# Patient Record
Sex: FEMALE | Race: WHITE | Employment: PART TIME | URBAN - METROPOLITAN AREA
[De-identification: names, ages, dates, MRNs, and addresses within clinical notes are randomized per-mention and may not be internally consistent; named-entity substitution may affect disease eponyms.]

---

## 2017-02-01 ENCOUNTER — HOSPITAL ENCOUNTER (OUTPATIENT)
Dept: MAMMOGRAPHY | Age: 52
Discharge: HOME | End: 2017-02-01
Attending: OBSTETRICS & GYNECOLOGY
Payer: COMMERCIAL

## 2017-02-01 DIAGNOSIS — Z12.31 VISIT FOR SCREENING MAMMOGRAM: ICD-10-CM

## 2017-02-01 PROCEDURE — 77067 SCR MAMMO BI INCL CAD: CPT

## 2018-05-30 ENCOUNTER — HOSPITAL ENCOUNTER (OUTPATIENT)
Dept: MAMMOGRAPHY | Age: 53
Discharge: HOME | End: 2018-05-30
Attending: FAMILY MEDICINE
Payer: COMMERCIAL

## 2018-05-30 DIAGNOSIS — Z12.39 BREAST CANCER SCREENING: ICD-10-CM

## 2018-05-30 PROCEDURE — 77067 SCR MAMMO BI INCL CAD: CPT

## 2018-06-01 NOTE — PROGRESS NOTES
Dear Mrs. Wing     Your recent mammogram showed :No mammographic evidence of malignancy.      Recommend annual mammogram in one year.  A reminder letter will be scheduled.     Thanks,  Dr. Joe Rosen

## 2018-11-16 ENCOUNTER — HOSPITAL ENCOUNTER (OUTPATIENT)
Dept: SURGERY | Age: 53
Discharge: HOME | End: 2018-11-16
Payer: COMMERCIAL

## 2018-11-16 VITALS
WEIGHT: 136 LBS | SYSTOLIC BLOOD PRESSURE: 135 MMHG | DIASTOLIC BLOOD PRESSURE: 81 MMHG | HEIGHT: 65 IN | OXYGEN SATURATION: 99 % | HEART RATE: 65 BPM | BODY MASS INDEX: 22.66 KG/M2 | TEMPERATURE: 97.6 F

## 2018-11-16 LAB — HGB BLD-MCNC: 15.1 G/DL (ref 11.7–15.4)

## 2018-11-16 PROCEDURE — 85018 HEMOGLOBIN: CPT

## 2018-11-16 NOTE — PERIOP NOTES
Patient verified name and  Order for consent not found in EHR; patient verified that case posting is incorrect. Per patient and Dr. Emma Ruano office note dated 11/15/18 ovaries are to be removed as well. Surgeon's office is closed today. Will have to call on Monday to notify surgery scheduler. Type 2 surgery, walk in assessment complete. Labs per surgeon: unknown; no orders received. Labs per anesthesia protocol: HGB; results pending. T&S dos. EKG: not needed per [facility] protocols. Most recent stress/echo report dated 10/31/13 on chart for reference. Requesting most recent office note (?) from [facility] cardiology. Hibiclens and instructions given per hospital policy. Patient provided with and instructed on educational handouts including Guide to Surgery, Pain Management, Hand Hygiene, Blood Transfusion Education, and [place] [facility] Brochure. Patient answered medical/surgical history questions at their best of ability. All prior to admission medications documented in [facility]. Original medication prescription bottles visualized during patient appointment. Patient instructed to hold all vitamins 7 days prior to surgery and NSAIDS 5 days prior to surgery, patient verbalized understanding. Medications to be held: vitamins. Patient instructed to continue previous medications as prescribed prior to surgery and to take the following medications the day of surgery according to anesthesia guidelines with a small sip of water: levothyroxine, dexilant. Instructed to bring dexilant and [employer]. Patient teach back successful and patient demonstrates knowledge of instructions.

## 2018-11-19 NOTE — PERIOP NOTES
Called surgeon's office and spoke with Claudia, surgery scheduler for Dr Darshan Wright. Erinn Ingrams informed that pt stated she was having her ovaries removed as well as hysterectomy with bilateral salpingectomy and surgeon's office note also states hysterectomy with BSO planned- Claudia states she will call OR scheduling to add procedure. Received EKG from [facility] (10/7/13)- normal tracing/result. Will place on chart for anesthesia reference.

## 2018-12-04 ENCOUNTER — ANESTHESIA EVENT (OUTPATIENT)
Dept: SURGERY | Age: 53
DRG: 743 | End: 2018-12-04
Payer: COMMERCIAL

## 2018-12-04 NOTE — H&P
Subjective:  
 
Chad Marina, MRN: [id number], is a 46 y.o.  female presents with AUB due to known large fibroids; these have been growing for years and patient has decided to have surgery. Beatriz Fuller gradually worsening course. See office notes on care. Patient Active Problem List  
 Diagnosis Date Noted  Migraine  Peripheral vertigo of both ears  Deviated septum  Unspecified hearing loss, unspecified ear  Intractable migraine with aura with status migrainosus 2016  Nystagmus 2016  Migraine with vertigo 2016  Encounter for medication management 2016  Postural imbalance 2016  Uterus fibroma   
 H. pylori infection 10/09/2013  Rosacea 10/24/2012  Hypothyroid 10/24/2012  IBS (irritable bowel syndrome) 10/24/2012 Past Medical History:  
Diagnosis Date  Deviated septum  GERD (gastroesophageal reflux disease)   
 prn med  
 H. pylori infection 10/9/2013  Heart murmur   
 echo 10/31/13  Hypothyroid 10/24/2012  IBS (irritable bowel syndrome) 10/24/2012  
 denies  Migraine   
 with head lights  Peripheral vertigo of both ears  Rosacea 10/24/2012  Spontaneous pneumothorax   
 had chest tube  Unspecified hearing loss, unspecified ear  Uterus fibroma  VSD (ventricular septal defect)   
 h/o; had heart cath in  but was not found so no repair was needed. Past Surgical History:  
Procedure Laterality Date  HX HEART CATHETERIZATION    
 for VSD repair but was not found during cath  HX THYROIDECTOMY  2011  
  
[unfilled] No Known Allergies Social History Tobacco Use  Smoking status: Former Smoker Years: 5.00 Last attempt to quit: 1999 Years since quittin.0  Smokeless tobacco: Never Used  Tobacco comment: social smoker for 5 years Substance Use Topics  Alcohol use: Yes Alcohol/week: 8.4 oz Types: 14 Glasses of wine per week Comment: white wine Family History Problem Relation Age of Onset  Breast Cancer Paternal Aunt  Thyroid Disease Mother  No Known Problems Father  Thyroid Disease Sister  No Known Problems Maternal Grandmother  No Known Problems Maternal Grandfather  No Known Problems Paternal Grandmother  No Known Problems Paternal Grandfather Prenatal Labs: No results found for: RUBELLAEXT, GRBSEXT, HBSAGEXT, HIVEXT, RPREXT, GONNOEXT, CHLAMEXT Review of Systems Constitutional: negative Respiratory: negative Cardiovascular: negative Musculoskeletal:negative Objective:  
 
No data found. No intake or output data in the 24 hours ending 12/04/18 1352 There were no vitals taken for this visit. General appearance: alert, cooperative, no distress, appears stated age Head: Normocephalic, without obvious abnormality, atraumatic Back: symmetric, no curvature. ROM normal. No CVA tenderness. Lungs: clear to auscultation bilaterally Heart: regular rate and rhythm, S1, S2 normal, no murmur, click, rub or gallop Abdomen: soft, non-tender. Bowel sounds normal.  no organomegaly, abnormal findings:  mass, located in the lower abdomen c/w uterine fibroids arising from pelvis Pelvic: External genitalia normal, Vagina normal without discharge, Urethra without abnormality or discharge, no bladder tenderness, cervix normal in appearance Extremities: extremities normal, atraumatic, no cyanosis or edema Pulses: 2+ and symmetric Skin: Skin color, texture, turgor normal. No rashes or lesions Assessment:  
 
Active Problems: * No active hospital problems. * 
 
 
Enlarging uterine fibroids, pt for KANDY Discussed risks of infection, DVT, damage to bowel/bladder/other internal organs, bleeding/transfusion, scar tissue/adhesions. All questions answered, will proceed. Plan:  
 
KANDY/BSO under general  anesthesia

## 2018-12-05 ENCOUNTER — HOSPITAL ENCOUNTER (INPATIENT)
Age: 53
LOS: 2 days | Discharge: HOME | DRG: 743 | End: 2018-12-07
Attending: OBSTETRICS & GYNECOLOGY | Admitting: OBSTETRICS & GYNECOLOGY
Payer: COMMERCIAL

## 2018-12-05 ENCOUNTER — ANESTHESIA (OUTPATIENT)
Dept: SURGERY | Age: 53
DRG: 743 | End: 2018-12-05
Payer: COMMERCIAL

## 2018-12-05 DIAGNOSIS — Z90.710 S/P ABDOMINAL HYSTERECTOMY: Primary | ICD-10-CM

## 2018-12-05 PROBLEM — N93.9 ABNORMAL UTERINE BLEEDING (AUB): Status: ACTIVE | Noted: 2018-12-05

## 2018-12-05 LAB
ABO + RH BLD: NORMAL
BLOOD GROUP ANTIBODIES SERPL: NORMAL
HCG UR QL: NEGATIVE
SPECIMEN EXP DATE BLD: NORMAL

## 2018-12-05 PROCEDURE — 77030039266 HC ADH SKN EXOFIN S2SG -A: Performed by: OBSTETRICS & GYNECOLOGY

## 2018-12-05 PROCEDURE — 0UT90ZZ RESECTION OF UTERUS, OPEN APPROACH: ICD-10-PCS | Performed by: OBSTETRICS & GYNECOLOGY

## 2018-12-05 PROCEDURE — 88307 TISSUE EXAM BY PATHOLOGIST: CPT

## 2018-12-05 PROCEDURE — 77030011278 HC ELECTRD LIG IMPT COVD -F: Performed by: OBSTETRICS & GYNECOLOGY

## 2018-12-05 PROCEDURE — 76210000016 HC OR PH I REC 1 TO 1.5 HR: Performed by: OBSTETRICS & GYNECOLOGY

## 2018-12-05 PROCEDURE — 76060000034 HC ANESTHESIA 1.5 TO 2 HR: Performed by: OBSTETRICS & GYNECOLOGY

## 2018-12-05 PROCEDURE — 77030020782 HC GWN BAIR PAWS FLX 3M -B: Performed by: ANESTHESIOLOGY

## 2018-12-05 PROCEDURE — 77030018836 HC SOL IRR NACL ICUM -A: Performed by: OBSTETRICS & GYNECOLOGY

## 2018-12-05 PROCEDURE — 74011250637 HC RX REV CODE- 250/637: Performed by: ANESTHESIOLOGY

## 2018-12-05 PROCEDURE — 77030037088 HC TUBE ENDOTRACH ORAL NSL COVD-A: Performed by: ANESTHESIOLOGY

## 2018-12-05 PROCEDURE — 77030032490 HC SLV COMPR SCD KNE COVD -B: Performed by: OBSTETRICS & GYNECOLOGY

## 2018-12-05 PROCEDURE — 65270000029 HC RM PRIVATE

## 2018-12-05 PROCEDURE — 77030031139 HC SUT VCRL2 J&J -A: Performed by: OBSTETRICS & GYNECOLOGY

## 2018-12-05 PROCEDURE — 0UT20ZZ RESECTION OF BILATERAL OVARIES, OPEN APPROACH: ICD-10-PCS | Performed by: OBSTETRICS & GYNECOLOGY

## 2018-12-05 PROCEDURE — 74011250637 HC RX REV CODE- 250/637: Performed by: OBSTETRICS & GYNECOLOGY

## 2018-12-05 PROCEDURE — 77030034849: Performed by: OBSTETRICS & GYNECOLOGY

## 2018-12-05 PROCEDURE — 86901 BLOOD TYPING SEROLOGIC RH(D): CPT

## 2018-12-05 PROCEDURE — 77030008703 HC TU ET UNCUF COVD -A: Performed by: ANESTHESIOLOGY

## 2018-12-05 PROCEDURE — 77030002888 HC SUT CHRMC J&J -A: Performed by: OBSTETRICS & GYNECOLOGY

## 2018-12-05 PROCEDURE — 74011250636 HC RX REV CODE- 250/636

## 2018-12-05 PROCEDURE — 74011250636 HC RX REV CODE- 250/636: Performed by: ANESTHESIOLOGY

## 2018-12-05 PROCEDURE — 74011000250 HC RX REV CODE- 250

## 2018-12-05 PROCEDURE — 76010000162 HC OR TIME 1.5 TO 2 HR INTENSV-TIER 1: Performed by: OBSTETRICS & GYNECOLOGY

## 2018-12-05 PROCEDURE — 77030003029 HC SUT VCRL J&J -B: Performed by: OBSTETRICS & GYNECOLOGY

## 2018-12-05 PROCEDURE — 81025 URINE PREGNANCY TEST: CPT

## 2018-12-05 PROCEDURE — 74011250636 HC RX REV CODE- 250/636: Performed by: OBSTETRICS & GYNECOLOGY

## 2018-12-05 PROCEDURE — 77030039425 HC BLD LARYNG TRULITE DISP TELE -A: Performed by: ANESTHESIOLOGY

## 2018-12-05 PROCEDURE — 77030011266 HC ELECTRD BLD INSL COVD -A: Performed by: OBSTETRICS & GYNECOLOGY

## 2018-12-05 PROCEDURE — 77030002966 HC SUT PDS J&J -A: Performed by: OBSTETRICS & GYNECOLOGY

## 2018-12-05 PROCEDURE — 77030027138 HC INCENT SPIROMETER -A

## 2018-12-05 PROCEDURE — 0UT70ZZ RESECTION OF BILATERAL FALLOPIAN TUBES, OPEN APPROACH: ICD-10-PCS | Performed by: OBSTETRICS & GYNECOLOGY

## 2018-12-05 PROCEDURE — 77030002974 HC SUT PLN J&J -A: Performed by: OBSTETRICS & GYNECOLOGY

## 2018-12-05 RX ORDER — CELECOXIB 100 MG/1
200 CAPSULE ORAL
Status: ACTIVE | OUTPATIENT
Start: 2018-12-05 | End: 2018-12-06

## 2018-12-05 RX ORDER — SODIUM CHLORIDE 0.9 % (FLUSH) 0.9 %
5-10 SYRINGE (ML) INJECTION AS NEEDED
Status: DISCONTINUED | OUTPATIENT
Start: 2018-12-05 | End: 2018-12-07 | Stop reason: HOSPADM

## 2018-12-05 RX ORDER — LIDOCAINE HYDROCHLORIDE 20 MG/ML
INJECTION, SOLUTION EPIDURAL; INFILTRATION; INTRACAUDAL; PERINEURAL AS NEEDED
Status: DISCONTINUED | OUTPATIENT
Start: 2018-12-05 | End: 2018-12-05 | Stop reason: HOSPADM

## 2018-12-05 RX ORDER — SODIUM CHLORIDE 0.9 % (FLUSH) 0.9 %
5-10 SYRINGE (ML) INJECTION EVERY 8 HOURS
Status: DISCONTINUED | OUTPATIENT
Start: 2018-12-05 | End: 2018-12-05 | Stop reason: HOSPADM

## 2018-12-05 RX ORDER — DOCUSATE SODIUM 100 MG/1
100 CAPSULE, LIQUID FILLED ORAL 2 TIMES DAILY
Status: DISCONTINUED | OUTPATIENT
Start: 2018-12-05 | End: 2018-12-07 | Stop reason: HOSPADM

## 2018-12-05 RX ORDER — NEOSTIGMINE METHYLSULFATE 1 MG/ML
INJECTION INTRAVENOUS AS NEEDED
Status: DISCONTINUED | OUTPATIENT
Start: 2018-12-05 | End: 2018-12-05 | Stop reason: HOSPADM

## 2018-12-05 RX ORDER — PROMETHAZINE HYDROCHLORIDE 25 MG/1
25 TABLET ORAL
Status: DISCONTINUED | OUTPATIENT
Start: 2018-12-05 | End: 2018-12-07 | Stop reason: HOSPADM

## 2018-12-05 RX ORDER — HYDROMORPHONE HYDROCHLORIDE 1 MG/ML
INJECTION, SOLUTION INTRAMUSCULAR; INTRAVENOUS; SUBCUTANEOUS AS NEEDED
Status: DISCONTINUED | OUTPATIENT
Start: 2018-12-05 | End: 2018-12-05 | Stop reason: HOSPADM

## 2018-12-05 RX ORDER — KETOROLAC TROMETHAMINE 30 MG/ML
INJECTION, SOLUTION INTRAMUSCULAR; INTRAVENOUS AS NEEDED
Status: DISCONTINUED | OUTPATIENT
Start: 2018-12-05 | End: 2018-12-05 | Stop reason: HOSPADM

## 2018-12-05 RX ORDER — PROPOFOL 10 MG/ML
INJECTION, EMULSION INTRAVENOUS AS NEEDED
Status: DISCONTINUED | OUTPATIENT
Start: 2018-12-05 | End: 2018-12-05 | Stop reason: HOSPADM

## 2018-12-05 RX ORDER — SODIUM CHLORIDE, SODIUM LACTATE, POTASSIUM CHLORIDE, CALCIUM CHLORIDE 600; 310; 30; 20 MG/100ML; MG/100ML; MG/100ML; MG/100ML
75 INJECTION, SOLUTION INTRAVENOUS CONTINUOUS
Status: DISCONTINUED | OUTPATIENT
Start: 2018-12-05 | End: 2018-12-05 | Stop reason: HOSPADM

## 2018-12-05 RX ORDER — OXYCODONE HYDROCHLORIDE 5 MG/1
5 TABLET ORAL
Status: DISCONTINUED | OUTPATIENT
Start: 2018-12-05 | End: 2018-12-05 | Stop reason: HOSPADM

## 2018-12-05 RX ORDER — DIPHENHYDRAMINE HCL 25 MG
25 CAPSULE ORAL
Status: DISCONTINUED | OUTPATIENT
Start: 2018-12-05 | End: 2018-12-07 | Stop reason: HOSPADM

## 2018-12-05 RX ORDER — SODIUM CHLORIDE 0.9 % (FLUSH) 0.9 %
5-10 SYRINGE (ML) INJECTION EVERY 8 HOURS
Status: DISCONTINUED | OUTPATIENT
Start: 2018-12-05 | End: 2018-12-07 | Stop reason: HOSPADM

## 2018-12-05 RX ORDER — SODIUM CHLORIDE, SODIUM LACTATE, POTASSIUM CHLORIDE, CALCIUM CHLORIDE 600; 310; 30; 20 MG/100ML; MG/100ML; MG/100ML; MG/100ML
50 INJECTION, SOLUTION INTRAVENOUS CONTINUOUS
Status: DISCONTINUED | OUTPATIENT
Start: 2018-12-05 | End: 2018-12-05 | Stop reason: HOSPADM

## 2018-12-05 RX ORDER — SODIUM CHLORIDE 0.9 % (FLUSH) 0.9 %
5-10 SYRINGE (ML) INJECTION AS NEEDED
Status: DISCONTINUED | OUTPATIENT
Start: 2018-12-05 | End: 2018-12-05 | Stop reason: HOSPADM

## 2018-12-05 RX ORDER — HYDROMORPHONE HYDROCHLORIDE 2 MG/ML
0.5 INJECTION, SOLUTION INTRAMUSCULAR; INTRAVENOUS; SUBCUTANEOUS
Status: DISCONTINUED | OUTPATIENT
Start: 2018-12-05 | End: 2018-12-05 | Stop reason: HOSPADM

## 2018-12-05 RX ORDER — MIDAZOLAM HYDROCHLORIDE 1 MG/ML
2 INJECTION, SOLUTION INTRAMUSCULAR; INTRAVENOUS
Status: DISCONTINUED | OUTPATIENT
Start: 2018-12-05 | End: 2018-12-05 | Stop reason: HOSPADM

## 2018-12-05 RX ORDER — LIDOCAINE HYDROCHLORIDE 10 MG/ML
0.1 INJECTION INFILTRATION; PERINEURAL AS NEEDED
Status: DISCONTINUED | OUTPATIENT
Start: 2018-12-05 | End: 2018-12-05 | Stop reason: HOSPADM

## 2018-12-05 RX ORDER — CEFAZOLIN SODIUM/WATER 2 G/20 ML
2 SYRINGE (ML) INTRAVENOUS ONCE
Status: COMPLETED | OUTPATIENT
Start: 2018-12-05 | End: 2018-12-05

## 2018-12-05 RX ORDER — MIDAZOLAM HYDROCHLORIDE 1 MG/ML
2 INJECTION, SOLUTION INTRAMUSCULAR; INTRAVENOUS ONCE
Status: COMPLETED | OUTPATIENT
Start: 2018-12-05 | End: 2018-12-05

## 2018-12-05 RX ORDER — ROCURONIUM BROMIDE 10 MG/ML
INJECTION, SOLUTION INTRAVENOUS AS NEEDED
Status: DISCONTINUED | OUTPATIENT
Start: 2018-12-05 | End: 2018-12-05 | Stop reason: HOSPADM

## 2018-12-05 RX ORDER — FENTANYL CITRATE 50 UG/ML
INJECTION, SOLUTION INTRAMUSCULAR; INTRAVENOUS AS NEEDED
Status: DISCONTINUED | OUTPATIENT
Start: 2018-12-05 | End: 2018-12-05 | Stop reason: HOSPADM

## 2018-12-05 RX ORDER — GLYCOPYRROLATE 0.2 MG/ML
INJECTION INTRAMUSCULAR; INTRAVENOUS AS NEEDED
Status: DISCONTINUED | OUTPATIENT
Start: 2018-12-05 | End: 2018-12-05 | Stop reason: HOSPADM

## 2018-12-05 RX ORDER — NALOXONE HYDROCHLORIDE 0.4 MG/ML
0.4 INJECTION, SOLUTION INTRAMUSCULAR; INTRAVENOUS; SUBCUTANEOUS AS NEEDED
Status: DISCONTINUED | OUTPATIENT
Start: 2018-12-05 | End: 2018-12-07 | Stop reason: HOSPADM

## 2018-12-05 RX ORDER — OXYCODONE AND ACETAMINOPHEN 10; 325 MG/1; MG/1
1 TABLET ORAL
Status: DISCONTINUED | OUTPATIENT
Start: 2018-12-05 | End: 2018-12-07 | Stop reason: HOSPADM

## 2018-12-05 RX ORDER — ONDANSETRON 2 MG/ML
INJECTION INTRAMUSCULAR; INTRAVENOUS AS NEEDED
Status: DISCONTINUED | OUTPATIENT
Start: 2018-12-05 | End: 2018-12-05 | Stop reason: HOSPADM

## 2018-12-05 RX ORDER — ALBUTEROL SULFATE 0.83 MG/ML
2.5 SOLUTION RESPIRATORY (INHALATION) AS NEEDED
Status: DISCONTINUED | OUTPATIENT
Start: 2018-12-05 | End: 2018-12-05 | Stop reason: HOSPADM

## 2018-12-05 RX ORDER — FENTANYL CITRATE 50 UG/ML
100 INJECTION, SOLUTION INTRAMUSCULAR; INTRAVENOUS ONCE
Status: DISCONTINUED | OUTPATIENT
Start: 2018-12-05 | End: 2018-12-05 | Stop reason: HOSPADM

## 2018-12-05 RX ORDER — ZOLPIDEM TARTRATE 5 MG/1
5 TABLET ORAL
Status: DISCONTINUED | OUTPATIENT
Start: 2018-12-05 | End: 2018-12-07 | Stop reason: HOSPADM

## 2018-12-05 RX ORDER — GABAPENTIN 300 MG/1
600 CAPSULE ORAL ONCE
Status: COMPLETED | OUTPATIENT
Start: 2018-12-05 | End: 2018-12-05

## 2018-12-05 RX ORDER — IBUPROFEN 800 MG/1
800 TABLET ORAL
Status: DISCONTINUED | OUTPATIENT
Start: 2018-12-05 | End: 2018-12-07 | Stop reason: HOSPADM

## 2018-12-05 RX ADMIN — HYDROMORPHONE HYDROCHLORIDE 0.3 MG: 1 INJECTION, SOLUTION INTRAMUSCULAR; INTRAVENOUS; SUBCUTANEOUS at 08:15

## 2018-12-05 RX ADMIN — OXYCODONE HYDROCHLORIDE AND ACETAMINOPHEN 1 TABLET: 10; 325 TABLET ORAL at 13:43

## 2018-12-05 RX ADMIN — DOCUSATE SODIUM 100 MG: 100 CAPSULE, LIQUID FILLED ORAL at 11:26

## 2018-12-05 RX ADMIN — SODIUM CHLORIDE, SODIUM LACTATE, POTASSIUM CHLORIDE, AND CALCIUM CHLORIDE 75 ML/HR: 600; 310; 30; 20 INJECTION, SOLUTION INTRAVENOUS at 06:22

## 2018-12-05 RX ADMIN — GLYCOPYRROLATE 0.6 MG: 0.2 INJECTION INTRAMUSCULAR; INTRAVENOUS at 08:15

## 2018-12-05 RX ADMIN — HYDROMORPHONE HYDROCHLORIDE 1 MG: 1 INJECTION, SOLUTION INTRAMUSCULAR; INTRAVENOUS; SUBCUTANEOUS at 07:15

## 2018-12-05 RX ADMIN — SODIUM CHLORIDE, SODIUM LACTATE, POTASSIUM CHLORIDE, AND CALCIUM CHLORIDE: 600; 310; 30; 20 INJECTION, SOLUTION INTRAVENOUS at 08:00

## 2018-12-05 RX ADMIN — ROCURONIUM BROMIDE 50 MG: 10 INJECTION, SOLUTION INTRAVENOUS at 07:22

## 2018-12-05 RX ADMIN — DOCUSATE SODIUM 100 MG: 100 CAPSULE, LIQUID FILLED ORAL at 17:17

## 2018-12-05 RX ADMIN — ONDANSETRON 4 MG: 2 INJECTION INTRAMUSCULAR; INTRAVENOUS at 07:30

## 2018-12-05 RX ADMIN — KETOROLAC TROMETHAMINE 30 MG: 30 INJECTION, SOLUTION INTRAMUSCULAR; INTRAVENOUS at 08:15

## 2018-12-05 RX ADMIN — FENTANYL CITRATE 100 MCG: 50 INJECTION, SOLUTION INTRAMUSCULAR; INTRAVENOUS at 07:22

## 2018-12-05 RX ADMIN — HYDROMORPHONE HYDROCHLORIDE 0.2 MG: 1 INJECTION, SOLUTION INTRAMUSCULAR; INTRAVENOUS; SUBCUTANEOUS at 08:45

## 2018-12-05 RX ADMIN — GABAPENTIN 600 MG: 300 CAPSULE ORAL at 06:47

## 2018-12-05 RX ADMIN — PROMETHAZINE HYDROCHLORIDE 25 MG: 25 TABLET ORAL at 13:43

## 2018-12-05 RX ADMIN — IBUPROFEN 800 MG: 800 TABLET ORAL at 17:17

## 2018-12-05 RX ADMIN — PROPOFOL 150 MG: 10 INJECTION, EMULSION INTRAVENOUS at 07:15

## 2018-12-05 RX ADMIN — NEOSTIGMINE METHYLSULFATE 4 MG: 1 INJECTION INTRAVENOUS at 08:15

## 2018-12-05 RX ADMIN — LIDOCAINE HYDROCHLORIDE 60 MG: 20 INJECTION, SOLUTION EPIDURAL; INFILTRATION; INTRACAUDAL; PERINEURAL at 07:22

## 2018-12-05 RX ADMIN — LIDOCAINE HYDROCHLORIDE 0.1 ML: 10 INJECTION, SOLUTION INFILTRATION; PERINEURAL at 06:33

## 2018-12-05 RX ADMIN — Medication 2 G: at 07:17

## 2018-12-05 RX ADMIN — MIDAZOLAM 2 MG: 1 INJECTION INTRAMUSCULAR; INTRAVENOUS at 06:58

## 2018-12-05 RX ADMIN — Medication 10 ML: at 14:00

## 2018-12-05 RX ADMIN — HYDROMORPHONE HYDROCHLORIDE 0.5 MG: 1 INJECTION, SOLUTION INTRAMUSCULAR; INTRAVENOUS; SUBCUTANEOUS at 07:30

## 2018-12-05 NOTE — PROGRESS NOTES
Problem: Falls - Risk of 
Goal: *Absence of Falls Document [place] Fall Risk and appropriate interventions in the flowsheet. Outcome: Progressing Towards Goal 
Fall Risk Interventions: 
  
 
  
 
Medication Interventions: Bed/chair exit alarm

## 2018-12-05 NOTE — PROGRESS NOTES
12/05/18 1102 Dual Skin Pressure Injury Assessment Dual Skin Pressure Injury Assessment WDL (lower transverse incision) Second Care Provider (Based on Facility Policy) Liliana Mansfield RN

## 2018-12-05 NOTE — ANESTHESIA PREPROCEDURE EVALUATION
Anesthetic History No history of anesthetic complications Review of Systems / Medical History Patient summary reviewed, nursing notes reviewed and pertinent labs reviewed Pulmonary Within defined limits Neuro/Psych Headaches Cardiovascular Within defined limits Exercise tolerance: >4 METS Comments: Negative stress echo 2013 
 
? Hx of VSD- had a cath to repair it and they could  Never find it GI/Hepatic/Renal 
  
GERD: well controlled Endo/Other Hypothyroidism: well controlled Other Findings Physical Exam 
 
Airway Mallampati: II 
 
Neck ROM: normal range of motion Mouth opening: Normal 
 
 Cardiovascular Regular rate and rhythm,  S1 and S2 normal,  no murmur, click, rub, or gallop Dental 
No notable dental hx Pulmonary Breath sounds clear to auscultation Abdominal 
 
 
 
 Other Findings Anesthetic Plan ASA: 2 Anesthesia type: general 
 
 
 
 
Induction: Intravenous Anesthetic plan and risks discussed with: Patient and Spouse

## 2018-12-05 NOTE — PROGRESS NOTES
Received telephone order from Dr. Darshan Wright to remove mcintosh at 0600 12/6. Dr. Darshan Wright stated \"US pt wants removed earlier and ambulating in hallway you can remove earlier\".

## 2018-12-05 NOTE — ANESTHESIA POSTPROCEDURE EVALUATION
Procedure(s): HYSTERECTOMY ABDOMINAL TOTAL WITH BILATERAL SALPINGO-OOPHORECTOMY. Anesthesia Post Evaluation Patient location during evaluation: PACU Patient participation: complete - patient participated Level of consciousness: awake and alert Pain management: adequate Airway patency: patent Anesthetic complications: no 
Cardiovascular status: acceptable Respiratory status: acceptable Hydration status: acceptable Visit Vitals /73 (BP 1 Location: Right arm, BP Patient Position: At rest) Pulse 77 Temp 37 °C (98.6 °F) Resp 16 SpO2 100%

## 2018-12-05 NOTE — PROGRESS NOTES
Admission assessment complete. Pt resting in bed/family at bedside. A&O x4. Respirations present, even, unlabored. Lung sounds clear to auscultation. HR regular, S1&S2 auscultated. IV capped and patent. Hugo in place draining yellow, clear urine. Bilateral SCDs in place. No complaints of pain at this time. Bed in the lowest position, call light within reach, side rails x3. Will continue to monitor throughout the shift.

## 2018-12-05 NOTE — PERIOP NOTES
TRANSFER - OUT REPORT: 
 
Verbal report given to Chanelle Garcia RN (name) on Margarita Nolan  being transferred to room 363 (unit) for routine post - op Report consisted of patients Situation, Background, Assessment and  
Recommendations(SBAR). Information from the following report(s) SBAR, Kardex, OR Summary and Intake/Output was reviewed with the receiving nurse. Lines:  
Peripheral IV 12/05/18 Left Hand (Active) Site Assessment Clean, dry, & intact 12/5/2018  8:55 AM  
Phlebitis Assessment 0 12/5/2018  8:55 AM  
Infiltration Assessment 0 12/5/2018  8:55 AM  
Dressing Status Clean, dry, & intact 12/5/2018  8:55 AM  
Dressing Type Tape;Transparent 12/5/2018  8:55 AM  
Hub Color/Line Status Infusing;Patent 12/5/2018  8:55 AM  
  
 
Opportunity for questions and clarification was provided. Patient transported with: 
 O2 @ 2 liters Tech

## 2018-12-05 NOTE — PROGRESS NOTES
TRANSFER - IN REPORT: 
 
Verbal report received from Merline Pastel, [facility] on Chandni Weaver  being received from PACU for routine post - op Report consisted of patients Situation, Background, Assessment and  
Recommendations(SBAR). Information from the following report(s) SBAR, Kardex, OR Summary, Intake/Output and MAR was reviewed with the receiving nurse. Opportunity for questions and clarification was provided. Assessment completed upon patients arrival to unit and care assumed.

## 2018-12-06 PROBLEM — Z90.710 S/P ABDOMINAL HYSTERECTOMY: Status: ACTIVE | Noted: 2018-12-06

## 2018-12-06 LAB
HCT VFR BLD AUTO: 36.1 % (ref 35.8–46.3)
HGB BLD-MCNC: 12.5 G/DL (ref 11.7–15.4)

## 2018-12-06 PROCEDURE — 94760 N-INVAS EAR/PLS OXIMETRY 1: CPT

## 2018-12-06 PROCEDURE — 65270000029 HC RM PRIVATE

## 2018-12-06 PROCEDURE — 77030011943

## 2018-12-06 PROCEDURE — 74011250637 HC RX REV CODE- 250/637: Performed by: OBSTETRICS & GYNECOLOGY

## 2018-12-06 PROCEDURE — 85018 HEMOGLOBIN: CPT

## 2018-12-06 PROCEDURE — 36415 COLL VENOUS BLD VENIPUNCTURE: CPT

## 2018-12-06 PROCEDURE — 77030034849

## 2018-12-06 PROCEDURE — 51798 US URINE CAPACITY MEASURE: CPT

## 2018-12-06 RX ORDER — SIMETHICONE 80 MG
80 TABLET,CHEWABLE ORAL
Status: DISCONTINUED | OUTPATIENT
Start: 2018-12-06 | End: 2018-12-07 | Stop reason: HOSPADM

## 2018-12-06 RX ADMIN — OXYCODONE HYDROCHLORIDE AND ACETAMINOPHEN 1 TABLET: 10; 325 TABLET ORAL at 09:22

## 2018-12-06 RX ADMIN — Medication 5 ML: at 05:12

## 2018-12-06 RX ADMIN — PROMETHAZINE HYDROCHLORIDE 25 MG: 25 TABLET ORAL at 09:22

## 2018-12-06 RX ADMIN — IBUPROFEN 800 MG: 800 TABLET ORAL at 17:23

## 2018-12-06 RX ADMIN — IBUPROFEN 800 MG: 800 TABLET ORAL at 05:05

## 2018-12-06 RX ADMIN — SIMETHICONE CHEW TAB 80 MG 80 MG: 80 TABLET ORAL at 10:12

## 2018-12-06 RX ADMIN — DOCUSATE SODIUM 100 MG: 100 CAPSULE, LIQUID FILLED ORAL at 17:23

## 2018-12-06 RX ADMIN — Medication 5 ML: at 14:29

## 2018-12-06 RX ADMIN — DOCUSATE SODIUM 100 MG: 100 CAPSULE, LIQUID FILLED ORAL at 08:04

## 2018-12-06 RX ADMIN — Medication 5 ML: at 22:04

## 2018-12-06 RX ADMIN — OXYCODONE HYDROCHLORIDE AND ACETAMINOPHEN 1 TABLET: 10; 325 TABLET ORAL at 17:23

## 2018-12-06 NOTE — PROGRESS NOTES
Pt only able to void 100 mL. Checked pt's bladder with the scanner; 850 mL on one side; 550 mL on the other side. In & out cath performed. Will monitor output.

## 2018-12-06 NOTE — PROGRESS NOTES
Pt still feels a lot of pressure in her bladder. Checked her bladder again with the scanner; 550 mL on one side & 650 mL on the other. Pt states she would prefer to have the mcintosh placed again to avoid the feeling of constant pressure.

## 2018-12-06 NOTE — PROGRESS NOTES
Pt still trying to void; she was able to void 200 mL. Checked pt's bladder with the scanner; shows 500 mL each side of the bladder. Pt wants to try & void again before we place a mcintosh catheter. Will continue to monitor.

## 2018-12-06 NOTE — PROGRESS NOTES
Family at bedside. Respirations present, non-labored. Abdomen soft. Low transverse incision - CD. Hugo dressing at bedside. Denies pain at this time. Aware to call if needs arise.

## 2018-12-06 NOTE — PROGRESS NOTES
Problem: Falls - Risk of 
Goal: *Absence of Falls Document Aramis Salas Fall Risk and appropriate interventions in the flowsheet. Outcome: Progressing Towards Goal 
Fall Risk Interventions: 
  
 
  
 
Medication Interventions: Teach patient to arise slowly

## 2018-12-06 NOTE — PROGRESS NOTES
POD # 1TAH/BSO Pt rested well, and tolerating some fluids; had some nausea and vomiting earlier, better now. Mcintosh removed this morning but no voiding well. Bedside sono shows urinary retention, pt will try to void again. If unsuccessful, will do in and out, or just replace mcintosh until tomorrow. Pain under control. Ambulated well. Alert and oriented, AVSS. No respiratory issues. Abdomen slightly distended suprapubicly with enlarged bladder, o/w bowel sounds present. Incision CDI. - DVT. hgb 12.5 Pt may need help with flatus, anti gas meds ordered. Today will work to improve UOP,.  If able to void well, may d/c am.

## 2018-12-06 NOTE — PROGRESS NOTES
Received sleeping. Respirations present and unlabored. HR is regular. Abdomen is soft. Low transverse incision, c/d/i. Hugo draining well at bedside. No c/o pain this time. Bed in low position. Call light within reach.

## 2018-12-07 VITALS
DIASTOLIC BLOOD PRESSURE: 106 MMHG | HEART RATE: 85 BPM | SYSTOLIC BLOOD PRESSURE: 130 MMHG | OXYGEN SATURATION: 100 % | RESPIRATION RATE: 18 BRPM | TEMPERATURE: 96.6 F

## 2018-12-07 PROCEDURE — 74011250637 HC RX REV CODE- 250/637: Performed by: OBSTETRICS & GYNECOLOGY

## 2018-12-07 RX ORDER — OXYCODONE AND ACETAMINOPHEN 10; 325 MG/1; MG/1
1 TABLET ORAL
Qty: 20 TAB | Refills: 0 | Status: SHIPPED | OUTPATIENT
Start: 2018-12-07 | End: 2019-05-01

## 2018-12-07 RX ORDER — IBUPROFEN 800 MG/1
800 TABLET ORAL
Qty: 30 TAB | Refills: 0 | Status: SHIPPED | OUTPATIENT
Start: 2018-12-07 | End: 2019-05-01

## 2018-12-07 RX ADMIN — Medication 10 ML: at 14:00

## 2018-12-07 RX ADMIN — OXYCODONE HYDROCHLORIDE AND ACETAMINOPHEN 1 TABLET: 10; 325 TABLET ORAL at 09:21

## 2018-12-07 RX ADMIN — OXYCODONE HYDROCHLORIDE AND ACETAMINOPHEN 1 TABLET: 10; 325 TABLET ORAL at 05:07

## 2018-12-07 RX ADMIN — DOCUSATE SODIUM 100 MG: 100 CAPSULE, LIQUID FILLED ORAL at 09:16

## 2018-12-07 RX ADMIN — Medication 5 ML: at 05:07

## 2018-12-07 NOTE — PROGRESS NOTES
Shift assessment done. Received alert and oriented. Respirations are even and unlabored. Lungs CTA. HR is regular. ABdomen is semi-soft with hypoactive bowel sounds. Lower transverse incision c/d/i. Denies pain at this time. Bed low and locked. Call light within reach.

## 2018-12-07 NOTE — DISCHARGE INSTRUCTIONS
DISCHARGE SUMMARY from Nurse    PATIENT INSTRUCTIONS:    After general anesthesia or intravenous sedation, for 24 hours or while taking prescription Narcotics:  · Limit your activities  · Do not drive and operate hazardous machinery  · Do not make important personal or business decisions  · Do  not drink alcoholic beverages  · If you have not urinated within 8 hours after discharge, please contact your surgeon on call. Report the following to your surgeon:  · Excessive pain, swelling, redness or odor of or around the surgical area  · Temperature over 100.5  · Nausea and vomiting lasting longer than 4 hours or if unable to take medications  · Any signs of decreased circulation or nerve impairment to extremity: change in color, persistent  numbness, tingling, coldness or increase pain  · Any questions    What to do at Home:  Recommended activity: Activity as tolerated, no heavy lifting, no driving on narcotics, no tub baths-may shower, nothing in the vagina,     If you experience any of the following symptoms severe abdominal pain, persistent nausea/vomiting, fever or other s/s of infection, heavy vaginal bleeding, please follow up with surgeon ASAP. *  Please give a list of your current medications to your Primary Care Provider. *  Please update this list whenever your medications are discontinued, doses are      changed, or new medications (including over-the-counter products) are added. *  Please carry medication information at all times in case of emergency situations. These are general instructions for a healthy lifestyle:    No smoking/ No tobacco products/ Avoid exposure to second hand smoke  Surgeon General's Warning:  Quitting smoking now greatly reduces serious risk to your health.     Obesity, smoking, and sedentary lifestyle greatly increases your risk for illness    A healthy diet, regular physical exercise & weight monitoring are important for maintaining a healthy lifestyle    You may be retaining fluid if you have a history of heart failure or if you experience any of the following symptoms:  Weight gain of 3 pounds or more overnight or 5 pounds in a week, increased swelling in our hands or feet or shortness of breath while lying flat in bed. Please call your doctor as soon as you notice any of these symptoms; do not wait until your next office visit. Recognize signs and symptoms of STROKE:    F-face looks uneven    A-arms unable to move or move unevenly    S-speech slurred or non-existent    T-time-call 911 as soon as signs and symptoms begin-DO NOT go       Back to bed or wait to see if you get better-TIME IS BRAIN. Warning Signs of HEART ATTACK     Call 911 if you have these symptoms:   Chest discomfort. Most heart attacks involve discomfort in the center of the chest that lasts more than a few minutes, or that goes away and comes back. It can feel like uncomfortable pressure, squeezing, fullness, or pain.  Discomfort in other areas of the upper body. Symptoms can include pain or discomfort in one or both arms, the back, neck, jaw, or stomach.  Shortness of breath with or without chest discomfort.  Other signs may include breaking out in a cold sweat, nausea, or lightheadedness. Don't wait more than five minutes to call 911 - MINUTES MATTER! Fast action can save your life. Calling 911 is almost always the fastest way to get lifesaving treatment. Emergency Medical Services staff can begin treatment when they arrive -- up to an hour sooner than if someone gets to the hospital by car. The discharge information has been reviewed with the patient. The patient verbalized understanding. Discharge medications reviewed with the patient and appropriate educational materials and side effects teaching were provided.   ___________________________________________________________________________________________________________________________________         Abdominal Hysterectomy: What to Expect at [address] can expect to feel better and stronger each day, although you may need pain medicine for a week or two. You may get tired easily or have less energy than usual. This may last for several weeks after surgery. You will probably notice that your belly is swollen and puffy. This is common. The swelling will take several weeks to go down. It may take about 4 to 6 weeks to fully recover. It is important to avoid lifting while you are recovering so that you can heal.  This care sheet gives you a general idea about how long it will take for you to recover. But each person recovers at a different pace. Follow the steps below to get better as quickly as possible. How can you care for yourself at home? Activity    · Rest when you feel tired. Getting enough sleep will help you recover.     · Try to walk each day. Start by walking a little more than you did the day before. Bit by bit, increase the amount you walk. Walking boosts blood flow and helps prevent pneumonia and constipation.     · Avoid lifting anything that would make you strain. This may include a child, heavy grocery bags and milk containers, a heavy briefcase or backpack, cat litter or dog food bags, or a vacuum .     · Avoid strenuous activities, such as biking, jogging, weight lifting, or aerobic exercise, until your doctor says it is okay.     · You may shower. Pat the cut (incision) dry. Do not take a bath for the first 2 weeks, or until your doctor tells you it is okay.     · Ask your doctor when you can drive again.     · You will probably need to take 2 to 4 weeks off from work. It depends on the type of work you do and how you feel.     · Your doctor will tell you when you can have sex again. Diet    · You can eat your normal diet. If your stomach is upset, try bland, low-fat foods like plain rice, broiled chicken, toast, and yogurt.     · Drink plenty of fluids (unless your doctor tells you not to).      · You may notice that your bowel movements are not regular right after your surgery. This is common. Try to avoid constipation and straining with bowel movements. You may want to take a fiber supplement every day. If you have not had a bowel movement after a couple of days, ask your doctor about taking a mild laxative. Medicines    · Your doctor will tell you if and when you can restart your medicines. He or she will also give you instructions about taking any new medicines.     · If you take blood thinners, such as warfarin (Coumadin), clopidogrel (Plavix), or aspirin, be sure to talk to your doctor. He or she will tell you if and when to start taking those medicines again. Make sure that you understand exactly what your doctor wants you to do.     · Be safe with medicines. Take pain medicines exactly as directed. ? If the doctor gave you a prescription medicine for pain, take it as prescribed. ? If you are not taking a prescription pain medicine, ask your doctor if you can take an over-the-counter medicine.     · If your doctor prescribed antibiotics, take them as directed. Do not stop taking them just because you feel better. You need to take the full course of antibiotics.     · If you think your pain medicine is making you sick to your stomach:  ? Take your medicine after meals (unless your doctor has told you not to). ? Ask your doctor for a different pain medicine. Incision care    · If you have strips of tape on the cut (incision) the doctor made, leave the tape on for a week or until it falls off. Or follow your doctor's instructions for removing the tape.     · Wash the area daily with warm, soapy water, and pat it dry. Don't use hydrogen peroxide or alcohol, which can slow healing. You may cover the area with a gauze bandage if it weeps or rubs against clothing. Change the bandage every day.     · Keep the area clean and dry. Other instructions    · You may have some light vaginal bleeding.  Wear sanitary pads if needed. Do not douche or use tampons. Follow-up care is a key part of your treatment and safety. Be sure to make and go to all appointments, and call your doctor if you are having problems. It's also a good idea to know your test results and keep a list of the medicines you take. When should you call for help? Call 911 anytime you think you may need emergency care. For example, call if:    · You passed out (lost consciousness).     · You have chest pain, are short of breath, or cough up blood.    Call your doctor now or seek immediate medical care if:    · You have pain that does not get better after you take pain medicine.     · You cannot pass stools or gas.     · You have vaginal discharge that has increased in amount or smells bad.     · You are sick to your stomach or cannot drink fluids.     · You have loose stitches, or your incision comes open.     · Bright red blood has soaked through the bandage over your incision.     · You have signs of infection, such as:  ? Increased pain, swelling, warmth, or redness. ? Red streaks leading from the incision. ? Pus draining from the incision. ? A fever.     · You have bright red vaginal bleeding that soaks one or more pads in an hour, or you have large clots.     · You have signs of a blood clot in your leg (called a deep vein thrombosis), such as:  ? Pain in your calf, back of the knee, thigh, or groin. ? Redness and swelling in your leg.    Watch closely for changes in your health, and be sure to contact your doctor if you have any problems. Where can you learn more? Go to http://berry-nikia.info/. Enter M280 in the search box to learn more about \"Abdominal Hysterectomy: What to Expect at Home. \"  Current as of: May 15, 2018  Content Version: 11.8  © 7065-3967 [employer]. Care instructions adapted under license by [employer] (which disclaims liability or warranty for this information).  If you have questions about a medical condition or this instruction, always ask your healthcare professional. [place] [address] any warranty or liability for your use of this information.

## 2018-12-07 NOTE — PROGRESS NOTES
Patient stated that she voided 300 ml and emptied it herself. Nurse personally did not see that she voided. Explained to patient that she would need to have a bladder scan. Patient refused. MD notified.

## 2018-12-07 NOTE — PROGRESS NOTES
Pt has been able to void 650 mL on her own in the past 4 hours. Hugo was very difficult to place back in the pt; she stated that she would prefer to keep trying to void on her own for now. Will continue to monitor.

## 2018-12-07 NOTE — PROGRESS NOTES
Discharge instructions were reviewed with the patient. Opportunity for questions given. Patient verbalized understanding of discharge and follow up instructions, as well as S/S to report to MD or return to ER for. PIV was removed. Prescriptions provided. Patient will D/C to home.

## 2018-12-07 NOTE — PROGRESS NOTES
Patient refuses to urinate in the \"hat\". Says that she just urinated once again. Patient was instructed to not flush so that nurse can examine urine.

## 2018-12-07 NOTE — PROGRESS NOTES
Am Assessment - Patient is alert and oriented x4. No complaint of pain at this time. Respirations even and unlabored. Lungs clear. Encouraged patient to urinate and to ambulate.  walking with patient in the hallway.

## 2018-12-07 NOTE — PROGRESS NOTES
Patient requested to have a bladder scan. Upon scanning the patient, the patient was retaining 629 ml of urine. However, patient has been urinating without difficulty and WNL. Dr. Laina Shukla was notified and he said to continue discharge. Patient was instructed if she had any difficulty urinating to call the doctor.

## 2018-12-07 NOTE — PROGRESS NOTES
Gynecology Progress Note Patient doing well post-op day 2 from Procedure(s): HYSTERECTOMY ABDOMINAL TOTAL WITH BILATERAL SALPINGO-OOPHORECTOMY without significant complaints. Pain controlled on current medication. Hugo in place after voiding difficulty. Patient is passing flatus. Vitals:  Blood pressure 135/86, pulse 83, temperature 98.6 °F (37 °C), resp. rate 18, SpO2 96 %, not currently breastfeeding. Temp (24hrs), Av °F (36.7 °C), Min:97.6 °F (36.4 °C), Max:98.6 °F (37 °C) Exam:  Patient without distress. Abdomen soft,  nontender. Incision dry and clean without erythema. Lower extremities are negative for swelling, cords, or tenderness. Lab/Data Review: All lab results for the last 24 hours reviewed. Assessment and Plan:  Patient appears to be having uncomplicated post Procedure(s): HYSTERECTOMY ABDOMINAL TOTAL WITH BILATERAL SALPINGO-OOPHORECTOMY course. Continue routine post-op care. D/C Hugo this AM.  Check PVR. If voids predictably may be discharged to home later today. Rx written and signed. If I/O caths required, Call Dr. Tosha Jin for instructions and she will discharge later this weekend.

## 2018-12-26 NOTE — OP NOTES
[facility] REPORT    Maulik Back  MR#: [id number]  : 1965  ACCOUNT #: [de-identified]   DATE OF SERVICE: 2018    PROCEDURE PERFORMED:  Total abdominal hysterectomy with bilateral salpingo-oophorectomy. PREOPERATIVE DIAGNOSIS:  Very large uterine fibroids with pelvic pain. POSTOPERATIVE DIAGNOSIS:  Very large uterine fibroids with pelvic pain. SURGEON:  Manny Del Rio MD.      ASSISTANT:  Dr. Ky Pickard DO    ESTIMATED BLOOD LOSS:  75 mL. COMPLICATIONS:  None. ANESTHESIA:  General.    SPECIMENS REMOVED:  Enlarged uterus with fibroids and normal tubes and ovaries. IMPLANTS:  None. DESCRIPTION OF PROCEDURE:  After informed consent, the patient was taken to the operating room and given general anesthesia. She was prepped and draped in the usual sterile fashion in the dorsal supine position. Pfannenstiel skin incision was made with the knife through the skin and subcutaneous tissue to the fascia. The fascia was extended bilaterally with Hernandez scissors. Rectus muscles were then divided in the midline and peritoneum was entered and this was divided superiorly and inferiorly avoiding bowel, bladder and omentum. Exploration showed that the uterus was very enlarged with fibroids. Multiple fibroids were used to pull upward and extract the uterus out of the abdomen and pelvis. It was difficult to get to the infundibulopelvic ligaments right away so the LigaSure device was used to clamp and cauterize the uteroovarian ligaments on both sides as well as the round ligaments and broad ligaments. The peritoneum was then taken down anteriorly to the level of the lower uterine segment and this was pushed downward to push the bladder off anteriorly. This exposed the uterine vessels and then the vessels were clamped, coagulated and cut with the LigaSure device. Again, the bladder was then pushed further down.   To facilitate the surgery, the uterus was amputated away from the cervix with the Bovie. Once this was done, the stump of the cervix was grasped with a tenaculum and the O'Chuy-O'Montano device was placed in the abdomen for retraction purposes and the bowel was packed superiorly. At this time, the trachelectomy was performed by again making sure that the bladder was off of the segment of the cervix and the LigaSure device was used to clamp and cauterize the parametrial tissue. Once this was down near the cervicovaginal junction, the curved R&N's were placed around the vagina apex and the cervix was cut away from the vagina using Jada scissors. These 2 clamps were then tied off with 0 Vicryl Mary stitches. The vaginal cuff was then closed by first placing Ortega angle stitches on either side in the corner and then the cuff was closed with anterior, posterior fashion using mattress sutures, placing the knots posteriorly. This provided good hemostasis. At this time, the ovaries and tubes were retrieved. The ovaries were then able to pull the ovaries away from the pelvic sidewall and evaluate for the ureter. Both sides of the LigaSure device were used to clamp and cauterize and cut to the infundibulopelvic ligament and the ovaries and tubes were removed. Inspection revealed gave us good hemostasis, and there was no obvious damage to the ureters, which were seen to be working well as the bowel or the bladder. Irrigation was carried out, and then the O'Montano-O'Chuy retractor was removed as well as the wet laps. Bowel was placed in its normal anatomical position. The peritoneum was closed with 2-0 chromic in a running stitch. Rectus muscles were brought together in the midline with 2-0 chromic interrupted stitches. The fascia was reapproximated with #1 Vicryl in a running stitch. Subcutaneous tissue was made hemostatic and irrigated and closed with interrupted sutures of 2-0 plain.   The skin was closed with 4-0 Vicryl in a subcuticular stitch and reinforced with Dermabond. The counts were correct x2, and the patient tolerated procedure well and went to recovery room in stable condition. MD PHILLIP Adler / Jerson Alcantara  D: 12/25/2018 14:47     T: 12/25/2018 19:56  JOB #: [id number]

## 2019-08-03 ENCOUNTER — HOSPITAL ENCOUNTER (OUTPATIENT)
Dept: MAMMOGRAPHY | Age: 54
Discharge: HOME | End: 2019-08-03
Attending: OBSTETRICS & GYNECOLOGY
Payer: COMMERCIAL

## 2019-08-03 DIAGNOSIS — Z12.39 ENCOUNTER FOR SCREENING FOR MALIGNANT NEOPLASM OF BREAST: ICD-10-CM

## 2019-08-03 PROCEDURE — 77063 BREAST TOMOSYNTHESIS BI: CPT

## 2020-08-24 ENCOUNTER — HOSPITAL ENCOUNTER (OUTPATIENT)
Dept: MAMMOGRAPHY | Age: 55
Discharge: HOME | End: 2020-08-24
Attending: INTERNAL MEDICINE
Payer: COMMERCIAL

## 2020-08-24 DIAGNOSIS — Z12.31 VISIT FOR SCREENING MAMMOGRAM: ICD-10-CM

## 2020-08-24 PROCEDURE — 77063 BREAST TOMOSYNTHESIS BI: CPT

## 2021-11-12 ENCOUNTER — TRANSCRIBE ORDER (OUTPATIENT)
Dept: REGISTRATION | Age: 56
End: 2021-11-12

## 2021-11-12 DIAGNOSIS — Z12.31 SCREENING MAMMOGRAM FOR HIGH-RISK PATIENT: Primary | ICD-10-CM

## 2021-11-24 ENCOUNTER — HOSPITAL ENCOUNTER (OUTPATIENT)
Dept: MAMMOGRAPHY | Age: 56
Discharge: HOME | End: 2021-11-24
Attending: INTERNAL MEDICINE
Payer: COMMERCIAL

## 2021-11-24 DIAGNOSIS — Z12.31 SCREENING MAMMOGRAM FOR HIGH-RISK PATIENT: ICD-10-CM

## 2021-11-24 PROCEDURE — 77067 SCR MAMMO BI INCL CAD: CPT

## 2022-03-19 PROBLEM — Z90.710 S/P ABDOMINAL HYSTERECTOMY: Status: ACTIVE | Noted: 2018-12-06

## 2022-03-19 PROBLEM — N93.9 ABNORMAL UTERINE BLEEDING (AUB): Status: ACTIVE | Noted: 2018-12-05

## 2022-06-09 RX ORDER — ESTRADIOL 0.07 MG/D
1 FILM, EXTENDED RELEASE TRANSDERMAL
Qty: 16 PATCH | Refills: 0 | Status: SHIPPED | OUTPATIENT
Start: 2022-06-09 | End: 2022-08-05 | Stop reason: SDUPTHER

## 2022-06-09 NOTE — TELEPHONE ENCOUNTER
----- Message from Tommie Pintoford sent at 6/9/2022  9:00 AM EDT -----  Subject: Refill Request    QUESTIONS  Name of Medication? estradiol (VIVELLE) 0.075 MG/24HR  Patient-reported dosage and instructions? 0.075 MG/ Place 1 patch onto the   skin Twice a Week  How many days do you have left? 3  [facility]? [id number]  Pharmacy phone number (if available)? [phone]  Additional Information for Provider? [place] out [place]   [facility]   ---------------------------------------------------------------------------  --------------  CALL BACK INFO  What is the best way for the office to contact you? OK to leave message on   voicemail  Preferred Call Back Phone Number? [phone]  ---------------------------------------------------------------------------  --------------  SCRIPT ANSWERS  Relationship to Patient?  Self

## 2022-06-09 NOTE — TELEPHONE ENCOUNTER
Estradiol to be sent to [facility] on [address] Patient is leaving town tomorrow for 3 weeks and really needs this today.

## 2022-07-22 RX ORDER — LEVOTHYROXINE SODIUM 112 MCG
TABLET ORAL
Qty: 30 TABLET | Refills: 1 | OUTPATIENT
Start: 2022-07-22

## 2022-07-22 RX ORDER — ESTRADIOL 0.07 MG/D
FILM, EXTENDED RELEASE TRANSDERMAL
Qty: 16 PATCH | Refills: 0 | OUTPATIENT
Start: 2022-07-22

## 2022-08-05 ENCOUNTER — TELEPHONE (OUTPATIENT)
Dept: FAMILY MEDICINE CLINIC | Facility: CLINIC | Age: 57
End: 2022-08-05

## 2022-08-05 RX ORDER — ESTRADIOL 0.07 MG/D
1 FILM, EXTENDED RELEASE TRANSDERMAL
Qty: 25 PATCH | Refills: 1 | Status: SHIPPED | OUTPATIENT
Start: 2022-08-08

## 2022-08-05 RX ORDER — LEVOTHYROXINE SODIUM 112 UG/1
TABLET ORAL
Qty: 30 TABLET | Refills: 5 | Status: SHIPPED | OUTPATIENT
Start: 2022-08-05

## 2022-08-05 NOTE — TELEPHONE ENCOUNTER
Levothyroxine 112 mcg and Estradiol 0.075 mg to [facility]. - [facility] and [employer] in [place]

## 2022-08-16 ENCOUNTER — OFFICE VISIT (OUTPATIENT)
Dept: ORTHOPEDIC SURGERY | Age: 57
End: 2022-08-16
Payer: COMMERCIAL

## 2022-08-16 DIAGNOSIS — M25.562 PAIN IN BOTH KNEES, UNSPECIFIED CHRONICITY: Primary | ICD-10-CM

## 2022-08-16 DIAGNOSIS — M25.561 PAIN IN BOTH KNEES, UNSPECIFIED CHRONICITY: Primary | ICD-10-CM

## 2022-08-16 PROCEDURE — 99203 OFFICE O/P NEW LOW 30 MIN: CPT | Performed by: ORTHOPAEDIC SURGERY

## 2022-08-16 NOTE — PROGRESS NOTES
Patient ID:  Malika Alex  [id number]  42 y.o.  1965    Today: August 16, 2022          Chief Complaint:  left knee pain   HPI:       Malika Alex is a 61 y.o. female seen for evaluation and treatment of left knee pain. The patient reports some locking and catching in the knee with some associated pain. They have not had significant issues with the knee in the past. They do do not report an injury to the knee within the past 12 weeks. They have attempted conservative treatment up to this point including NSAIDS, rest, ice and elevation. Prior procedures on the knee include none. Treatment to date has included ice, heat, NSAID's, with significant relief. Past Medical History:  Past Medical History:   Diagnosis Date    Deviated septum     GERD (gastroesophageal reflux disease)     prn med    H. pylori infection 10/9/2013    Heart murmur     echo 10/31/13    Hypothyroid 10/24/2012    IBS (irritable bowel syndrome) 10/24/2012    denies    Migraine     with head lights    Peripheral vertigo of both ears     Rosacea 10/24/2012    Spontaneous pneumothorax 1999    had chest tube     Unspecified hearing loss, unspecified ear     Uterus fibroma     VSD (ventricular septal defect)     h/o; had heart cath in 1999 but was not found so no repair was needed. Past Surgical History:  Past Surgical History:   Procedure Laterality Date    CARDIAC CATHETERIZATION  1999    for VSD repair but was not found during cath     THYROIDECTOMY  03/2011    TOTAL ABDOMINAL HYSTERECTOMY W/ BILATERAL SALPINGOOPHORECTOMY  12/05/2018        Medications:     Prior to Admission medications    Medication Sig Start Date End Date Taking?  Authorizing Provider   levothyroxine (SYNTHROID) 112 MCG tablet TAKE 1 TABLET (112 MCG) BY MOUTH DAILY BRAND NAME ONLY 8/5/22   Karen Byole MD   estradiol (VIVELLE) 0.075 MG/24HR Place 1 patch onto the skin Twice a Week 8/8/22   Karen Boyle MD   dexlansoprazole (DEXILANT) 60 MG CPDR delayed release capsule Take 60 mg by mouth daily 17   Ar Automatic Reconciliation   Ivermectin (SOOLANTRA) 1 % CREA APPLY THINLY TO FACE DAILY 16   Ar Automatic Reconciliation   vitamin E 400 UNIT capsule Take 400 Units by mouth daily    Ar Automatic Reconciliation       Family History:     Family History   Problem Relation Age of Onset    Breast Cancer Paternal Aunt 54    Thyroid Disease Mother     No Known Problems Father     Thyroid Disease Sister     No Known Problems Maternal Grandmother     No Known Problems Maternal Grandfather     No Known Problems Paternal Grandmother     No Known Problems Paternal Grandfather        Social History:      Social History     Tobacco Use    Smoking status: Former     Types: Cigarettes     Quit date: 1999     Years since quittin.7    Smokeless tobacco: Never    Tobacco comments:     Quit smoking: social smoker for 5 years   Substance Use Topics    Alcohol use: Yes     Alcohol/week: 14.0 standard drinks         Allergies:    Not on File     Vitals: There were no vitals taken for this visit. ROS:   Review of Systems         Objective:   General: Patient is awake and in no acute distress  Psych: Mood and affect appropriate  HEENT: Normocephalic. Atramatic. Pupils equal, round and reactive. Sclera normal.   Neck: Supple without obvious mass   Chest: Symmetric  Lungs:  Breathing non-labored. No tachypnea noted. Abdomen: Soft on gross examination without obvious distention. Neuro: No obvious neurologic deficit. Grossly moves bilateral upper extremities without motor or sensory deficits. No gross weakness noted in the lower extremities. No hyporeflexia or hyperreflexia noted. Vascular: No gross arterial or venous deficiency noted. DP and PT pulses are palpable in the lower extremities  Lymphatic: No lymphedema noted in the lower extremities. Skin: No prior incisions noted about the rleft knee. No obvious rashes noted about the area.  No skin changes noted about the knee or about the adjacent thigh or leg. Extremities:  Patient ambulates with an antalgic gait. There is some pain with ROM of the knee. Range of motion is 0-130. Trace effusion noted in the knee. Patellofemoral crepitus is absent. There is not pain with Nydia's testing. I cannot elicit on obvious lock with this maneuver but the maneuver does cause pain. Distally the patient shows no neurologic deficit. Xrays:     XR Knee 3/4 View  Views: AP knee, skiers PA, lateral knee, sunrise view bilateral knee  Clinical indication: Bilateral Knee Pain   Findings: No evidence of osteoarthritic changes. Joint space appears to be well preserved. No evidence of fracture or suggestion of obvious pathology  Impression: Normal appearing bilateral knee    Key Sexton MD        Assessment:   1. Left knee pain     Plan:  Given the patient clinical presentation, including primary complaint and physical exam paired with fairly unremarkable xrays the patient would like to proceed with conservative self care. She isnt hurting at the time. In the future recommend RICE should her knee pain recur.     Signed By: Key Sexton MD  August 16, 2022

## 2022-08-23 ENCOUNTER — OFFICE VISIT (OUTPATIENT)
Dept: FAMILY MEDICINE CLINIC | Facility: CLINIC | Age: 57
End: 2022-08-23
Payer: COMMERCIAL

## 2022-08-23 VITALS
BODY MASS INDEX: 25.44 KG/M2 | OXYGEN SATURATION: 98 % | WEIGHT: 148.2 LBS | HEART RATE: 68 BPM | SYSTOLIC BLOOD PRESSURE: 110 MMHG | TEMPERATURE: 97.8 F | DIASTOLIC BLOOD PRESSURE: 62 MMHG

## 2022-08-23 DIAGNOSIS — K21.00 GASTRO-ESOPHAGEAL REFLUX DISEASE WITH ESOPHAGITIS, WITHOUT BLEEDING: ICD-10-CM

## 2022-08-23 DIAGNOSIS — E03.8 OTHER SPECIFIED HYPOTHYROIDISM: Primary | ICD-10-CM

## 2022-08-23 LAB — TSH, 3RD GENERATION: 1.21 UIU/ML (ref 0.36–3.74)

## 2022-08-23 PROCEDURE — 99213 OFFICE O/P EST LOW 20 MIN: CPT | Performed by: FAMILY MEDICINE

## 2022-08-23 ASSESSMENT — PATIENT HEALTH QUESTIONNAIRE - PHQ9
SUM OF ALL RESPONSES TO PHQ9 QUESTIONS 1 & 2: 0
SUM OF ALL RESPONSES TO PHQ QUESTIONS 1-9: 0
1. LITTLE INTEREST OR PLEASURE IN DOING THINGS: 0
SUM OF ALL RESPONSES TO PHQ QUESTIONS 1-9: 0
2. FEELING DOWN, DEPRESSED OR HOPELESS: 0

## 2022-08-30 ASSESSMENT — ENCOUNTER SYMPTOMS
RESPIRATORY NEGATIVE: 1
GASTROINTESTINAL NEGATIVE: 1
EYES NEGATIVE: 1

## 2022-08-30 NOTE — PROGRESS NOTES
HISTORY OF PRESENT ILLNESS  Kashmir Gu is a 61 y.o. y.o. female    Fatigue  This is a recurrent (hypoth) problem. The problem has been gradually improving. Associated symptoms include fatigue. No Known Allergies     Current Outpatient Medications   Medication Sig    levothyroxine (SYNTHROID) 112 MCG tablet TAKE 1 TABLET (112 MCG) BY MOUTH DAILY BRAND NAME ONLY    estradiol (VIVELLE) 0.075 MG/24HR Place 1 patch onto the skin Twice a Week    vitamin E 400 UNIT capsule Take 400 Units by mouth daily    dexlansoprazole (DEXILANT) 60 MG CPDR delayed release capsule Take 60 mg by mouth daily (Patient not taking: Reported on 2022)     No current facility-administered medications for this visit. Past Medical History:   Diagnosis Date    Deviated septum     GERD (gastroesophageal reflux disease)     prn med    H. pylori infection 10/9/2013    Heart murmur     echo 10/31/13    Hypothyroid 10/24/2012    IBS (irritable bowel syndrome) 10/24/2012    denies    Migraine     with head lights    Peripheral vertigo of both ears     Rosacea 10/24/2012    Spontaneous pneumothorax     had chest tube     Unspecified hearing loss, unspecified ear     Uterus fibroma     VSD (ventricular septal defect)     h/o; had heart cath in  but was not found so no repair was needed.          Past Surgical History:   Procedure Laterality Date    CARDIAC CATHETERIZATION      for VSD repair but was not found during cath     ARYA AND BSO (CERVIX REMOVED)  2018    THYROIDECTOMY  2011        Social History     Socioeconomic History    Marital status:      Spouse name: Not on file    Number of children: Not on file    Years of education: Not on file    Highest education level: Not on file   Occupational History    Not on file   Tobacco Use    Smoking status: Former     Types: Cigarettes     Quit date: 1999     Years since quittin.8    Smokeless tobacco: Never    Tobacco comments:     Quit smoking: social smoker for 5 years   Substance and Sexual Activity    Alcohol use: Yes     Alcohol/week: 14.0 standard drinks    Drug use: No    Sexual activity: Not on file     Comment: Vasectomy and hysterectomy    Other Topics Concern    Not on file   Social History Narrative    Not on file     Social Determinants of Health     Financial Resource Strain: Not on file   Food Insecurity: Not on file   Transportation Needs: Not on file   Physical Activity: Not on file   Stress: Not on file   Social Connections: Not on file   Intimate Partner Violence: Not on file   Housing Stability: Not on file        Review of Systems   Constitutional:  Positive for fatigue. HENT: Negative. Eyes: Negative. Respiratory: Negative. Cardiovascular: Negative. Gastrointestinal: Negative. Endocrine: Negative. Genitourinary: Negative. Musculoskeletal: Negative. Skin: Negative. Neurological: Negative. Psychiatric/Behavioral: Negative. /62 (Site: Left Upper Arm, Position: Sitting, Cuff Size: Medium Adult)   Pulse 68   Temp 97.8 °F (36.6 °C)   Wt 148 lb 3.2 oz (67.2 kg)   SpO2 98%   BMI 25.44 kg/m²      Physical Exam  Constitutional:       Appearance: Normal appearance. HENT:      Head: Normocephalic. Right Ear: Tympanic membrane, ear canal and external ear normal.      Left Ear: Tympanic membrane, ear canal and external ear normal.      Nose: Nose normal.      Mouth/Throat:      Mouth: Mucous membranes are moist.      Pharynx: Oropharynx is clear. Eyes:      General: No scleral icterus. Extraocular Movements: Extraocular movements intact. Conjunctiva/sclera: Conjunctivae normal.   Neck:      Vascular: No carotid bruit. Cardiovascular:      Rate and Rhythm: Normal rate and regular rhythm. Pulses: Normal pulses. Heart sounds: Normal heart sounds. Pulmonary:      Effort: Pulmonary effort is normal. No respiratory distress. Breath sounds: Normal breath sounds.  No wheezing or rales.   Abdominal:      General: Abdomen is flat. Bowel sounds are normal. There is no distension. Palpations: Abdomen is soft. There is no mass. Tenderness: There is no abdominal tenderness. Musculoskeletal:         General: Normal range of motion. Cervical back: Normal range of motion and neck supple. Skin:     General: Skin is warm and dry. Neurological:      General: No focal deficit present. Mental Status: She is alert and oriented to person, place, and time. Psychiatric:         Mood and Affect: Mood normal.         Behavior: Behavior normal.         Thought Content:  Thought content normal.         Judgment: Judgment normal.       Orders Only on 03/16/2022   Component Date Value Ref Range Status    WBC 03/16/2022 5.7  3.4 - 10.8 x10E3/uL Final    RBC 03/16/2022 4.75  3.77 - 5.28 x10E6/uL Final    Hemoglobin 03/16/2022 14.5  11.1 - 15.9 g/dL Final    Hematocrit 03/16/2022 42.0  34.0 - 46.6 % Final    MCV 03/16/2022 88  79 - 97 fL Final    MCH 03/16/2022 30.5  26.6 - 33.0 pg Final    MCHC 03/16/2022 34.5  31.5 - 35.7 g/dL Final    RDW 03/16/2022 13.1  11.7 - 15.4 % Final    Platelets 85/27/5421 271  150 - 450 x10E3/uL Final    Neutrophils % 03/16/2022 56  Not Estab. % Final    Lymphocytes % 03/16/2022 31  Not Estab. % Final    Monocytes % 03/16/2022 7  Not Estab. % Final    Eosinophils % 03/16/2022 4  Not Estab. % Final    Basophils % 03/16/2022 1  Not Estab. % Final    Neutrophils Absolute 03/16/2022 3.2  1.4 - 7.0 x10E3/uL Final    Lymphocytes Absolute 03/16/2022 1.8  0.7 - 3.1 x10E3/uL Final    Monocytes Absolute 03/16/2022 0.4  0.1 - 0.9 x10E3/uL Final    Eosinophils Absolute 03/16/2022 0.2  0.0 - 0.4 x10E3/uL Final    Basophils Absolute 03/16/2022 0.1  0.0 - 0.2 x10E3/uL Final    Immature Granulocytes 03/16/2022 1  Not Estab. % Final    GRANULOCYTE ABSOLUTE COUNT 03/16/2022 0.0  0.0 - 0.1 x10E3/uL Final    Cholesterol, Total 03/16/2022 206 (A) 100 - 199 mg/dL Final Triglycerides 03/16/2022 86  0 - 149 mg/dL Final    HDL 03/16/2022 63  >39 mg/dL Final    VLDL 03/16/2022 15  5 - 40 mg/dL Final    LDL Calculated 03/16/2022 128 (A) 0 - 99 mg/dL Final    TSH 03/16/2022 2.630  0.450 - 4.500 uIU/mL Final    Specific Gravity, UA 03/16/2022 1.024  1.005 - 1.030 NA Final    pH, UA 03/16/2022 5.5  5.0 - 7.5 NA Final    Color, UA 03/16/2022 Yellow  Yellow Final    Clarity, UA 03/16/2022 Cloudy (A) Clear Final    Leukocyte Esterase, Urine 03/16/2022 Negative  Negative Final    Protein, UA 03/16/2022 Negative  Negative/Trace Final    Glucose, UA 03/16/2022 Negative  Negative Final    Ketones, Urine 03/16/2022 Negative  Negative Final    Blood, Urine 03/16/2022 Negative  Negative Final    Bilirubin, Urine 03/16/2022 Negative  Negative Final    Urobilinogen, Urine 03/16/2022 0.2  0.2 - 1.0 mg/dL Final    Nitrite, Urine 03/16/2022 Negative  Negative Final    Microscopic Examination 03/16/2022 Comment   Final    Microscopic not indicated and not performed.     Glucose 03/16/2022 97  65 - 99 mg/dL Final    BUN 03/16/2022 15  6 - 24 mg/dL Final    Creatinine 03/16/2022 0.75  0.57 - 1.00 mg/dL Final    EGFR 03/16/2022 93  >59 mL/min/1.73 Final    Bun/Cre Ratio 03/16/2022 20  9 - 23 NA Final    Sodium 03/16/2022 138  134 - 144 mmol/L Final    Potassium 03/16/2022 4.4  3.5 - 5.2 mmol/L Final    Chloride 03/16/2022 103  96 - 106 mmol/L Final    CO2 03/16/2022 19 (A) 20 - 29 mmol/L Final    Calcium 03/16/2022 9.2  8.7 - 10.2 mg/dL Final    Total Protein 03/16/2022 6.7  6.0 - 8.5 g/dL Final    Albumin 03/16/2022 4.5  3.8 - 4.9 g/dL Final    Globulin, Total 03/16/2022 2.2  1.5 - 4.5 g/dL Final    Albumin/Globulin Ratio 03/16/2022 2.0  1.2 - 2.2 NA Final    Total Bilirubin 03/16/2022 0.7  0.0 - 1.2 mg/dL Final    Alkaline Phosphatase 03/16/2022 68  44 - 121 IU/L Final    AST 03/16/2022 21  0 - 40 IU/L Final    ALT 03/16/2022 15  0 - 32 IU/L Final       ASSESSMENT and PLAN    Other specified hypothyroidism  -     TSH; Future  Gastro-esophageal reflux disease with esophagitis, without bleeding      Current treatment plan is effective. no changes in therapy  reviewed diet, exercise and weight control     No follow-ups on file.      Ananth White MD

## 2022-10-26 RX ORDER — LEVOTHYROXINE SODIUM 112 MCG
TABLET ORAL
Qty: 30 TABLET | Refills: 5 | OUTPATIENT
Start: 2022-10-26

## 2022-10-29 ENCOUNTER — HOSPITAL ENCOUNTER (EMERGENCY)
Age: 57
Discharge: HOME | End: 2022-10-30
Attending: STUDENT IN AN ORGANIZED HEALTH CARE EDUCATION/TRAINING PROGRAM
Payer: COMMERCIAL

## 2022-10-29 ENCOUNTER — HOSPITAL ENCOUNTER (EMERGENCY)
Dept: ULTRASOUND IMAGING | Age: 57
Discharge: HOME | End: 2022-11-01
Payer: COMMERCIAL

## 2022-10-29 VITALS
SYSTOLIC BLOOD PRESSURE: 144 MMHG | HEART RATE: 88 BPM | OXYGEN SATURATION: 96 % | HEIGHT: 64 IN | WEIGHT: 148.2 LBS | RESPIRATION RATE: 20 BRPM | DIASTOLIC BLOOD PRESSURE: 79 MMHG | BODY MASS INDEX: 25.3 KG/M2 | TEMPERATURE: 98.2 F

## 2022-10-29 DIAGNOSIS — M79.604 RIGHT LEG PAIN: Primary | ICD-10-CM

## 2022-10-29 DIAGNOSIS — S86.111A RUPTURE OF RIGHT GASTROCNEMIUS TENDON, INITIAL ENCOUNTER: ICD-10-CM

## 2022-10-29 LAB
ALBUMIN SERPL-MCNC: 3.8 G/DL (ref 3.5–5)
ALBUMIN/GLOB SERPL: 1.4 {RATIO} (ref 0.4–1.6)
ALP SERPL-CCNC: 74 U/L (ref 50–136)
ALT SERPL-CCNC: 23 U/L (ref 12–65)
ANION GAP SERPL CALC-SCNC: 7 MMOL/L (ref 2–11)
AST SERPL-CCNC: 17 U/L (ref 15–37)
BASOPHILS # BLD: 0.1 K/UL (ref 0–0.2)
BASOPHILS NFR BLD: 1 % (ref 0–2)
BILIRUB SERPL-MCNC: 0.7 MG/DL (ref 0.2–1.1)
BUN SERPL-MCNC: 17 MG/DL (ref 6–23)
CALCIUM SERPL-MCNC: 9 MG/DL (ref 8.3–10.4)
CHLORIDE SERPL-SCNC: 108 MMOL/L (ref 101–110)
CO2 SERPL-SCNC: 24 MMOL/L (ref 21–32)
CREAT SERPL-MCNC: 0.7 MG/DL (ref 0.6–1)
D DIMER PPP FEU-MCNC: 0.31 UG/ML(FEU)
DIFFERENTIAL METHOD BLD: ABNORMAL
EOSINOPHIL # BLD: 0.3 K/UL (ref 0–0.8)
EOSINOPHIL NFR BLD: 3 % (ref 0.5–7.8)
ERYTHROCYTE [DISTWIDTH] IN BLOOD BY AUTOMATED COUNT: 12.7 % (ref 11.9–14.6)
GLOBULIN SER CALC-MCNC: 2.7 G/DL (ref 2.8–4.5)
GLUCOSE SERPL-MCNC: 152 MG/DL (ref 65–100)
HCT VFR BLD AUTO: 37.5 % (ref 35.8–46.3)
HGB BLD-MCNC: 13.2 G/DL (ref 11.7–15.4)
IMM GRANULOCYTES # BLD AUTO: 0 K/UL (ref 0–0.5)
IMM GRANULOCYTES NFR BLD AUTO: 0 % (ref 0–5)
INR PPP: 1
LYMPHOCYTES # BLD: 2.1 K/UL (ref 0.5–4.6)
LYMPHOCYTES NFR BLD: 22 % (ref 13–44)
MCH RBC QN AUTO: 30.3 PG (ref 26.1–32.9)
MCHC RBC AUTO-ENTMCNC: 35.2 G/DL (ref 31.4–35)
MCV RBC AUTO: 86.2 FL (ref 82–102)
MONOCYTES # BLD: 0.5 K/UL (ref 0.1–1.3)
MONOCYTES NFR BLD: 5 % (ref 4–12)
NEUTS SEG # BLD: 6.7 K/UL (ref 1.7–8.2)
NEUTS SEG NFR BLD: 70 % (ref 43–78)
NRBC # BLD: 0 K/UL (ref 0–0.2)
PLATELET # BLD AUTO: 266 K/UL (ref 150–450)
PMV BLD AUTO: 9.4 FL (ref 9.4–12.3)
POTASSIUM SERPL-SCNC: 3.5 MMOL/L (ref 3.5–5.1)
PROT SERPL-MCNC: 6.5 G/DL (ref 6.3–8.2)
PROTHROMBIN TIME: 13.7 SEC (ref 12.6–14.3)
RBC # BLD AUTO: 4.35 M/UL (ref 4.05–5.2)
SODIUM SERPL-SCNC: 139 MMOL/L (ref 133–143)
WBC # BLD AUTO: 9.6 K/UL (ref 4.3–11.1)

## 2022-10-29 PROCEDURE — 93971 EXTREMITY STUDY: CPT

## 2022-10-29 PROCEDURE — 96375 TX/PRO/DX INJ NEW DRUG ADDON: CPT

## 2022-10-29 PROCEDURE — 85379 FIBRIN DEGRADATION QUANT: CPT

## 2022-10-29 PROCEDURE — 85610 PROTHROMBIN TIME: CPT

## 2022-10-29 PROCEDURE — 80053 COMPREHEN METABOLIC PANEL: CPT

## 2022-10-29 PROCEDURE — 96374 THER/PROPH/DIAG INJ IV PUSH: CPT

## 2022-10-29 PROCEDURE — 99284 EMERGENCY DEPT VISIT MOD MDM: CPT

## 2022-10-29 PROCEDURE — 85025 COMPLETE CBC W/AUTO DIFF WBC: CPT

## 2022-10-29 ASSESSMENT — PAIN - FUNCTIONAL ASSESSMENT: PAIN_FUNCTIONAL_ASSESSMENT: 0-10

## 2022-10-29 ASSESSMENT — PAIN SCALES - GENERAL: PAINLEVEL_OUTOF10: 8

## 2022-10-30 LAB
CK SERPL-CCNC: 105 U/L (ref 21–215)
LACTATE SERPL-SCNC: 0.6 MMOL/L (ref 0.4–2)

## 2022-10-30 PROCEDURE — 82550 ASSAY OF CK (CPK): CPT

## 2022-10-30 PROCEDURE — 83605 ASSAY OF LACTIC ACID: CPT

## 2022-10-30 PROCEDURE — 6360000002 HC RX W HCPCS: Performed by: STUDENT IN AN ORGANIZED HEALTH CARE EDUCATION/TRAINING PROGRAM

## 2022-10-30 RX ORDER — MORPHINE SULFATE 2 MG/ML
2 INJECTION, SOLUTION INTRAMUSCULAR; INTRAVENOUS ONCE
Status: COMPLETED | OUTPATIENT
Start: 2022-10-30 | End: 2022-10-30

## 2022-10-30 RX ORDER — ONDANSETRON 2 MG/ML
4 INJECTION INTRAMUSCULAR; INTRAVENOUS
Status: COMPLETED | OUTPATIENT
Start: 2022-10-30 | End: 2022-10-30

## 2022-10-30 RX ORDER — HYDROCODONE BITARTRATE AND ACETAMINOPHEN 5; 325 MG/1; MG/1
1 TABLET ORAL EVERY 6 HOURS PRN
Qty: 10 TABLET | Refills: 0 | Status: SHIPPED | OUTPATIENT
Start: 2022-10-30 | End: 2022-11-02

## 2022-10-30 RX ADMIN — ONDANSETRON 4 MG: 2 INJECTION INTRAMUSCULAR; INTRAVENOUS at 00:51

## 2022-10-30 RX ADMIN — MORPHINE SULFATE 2 MG: 2 INJECTION, SOLUTION INTRAMUSCULAR; INTRAVENOUS at 00:51

## 2022-10-30 ASSESSMENT — ENCOUNTER SYMPTOMS
NAUSEA: 0
SHORTNESS OF BREATH: 0
PHOTOPHOBIA: 0
VOMITING: 0
COUGH: 0
DIARRHEA: 0
SORE THROAT: 0

## 2022-10-30 ASSESSMENT — PAIN DESCRIPTION - ORIENTATION: ORIENTATION: RIGHT

## 2022-10-30 ASSESSMENT — PAIN DESCRIPTION - LOCATION: LOCATION: LEG

## 2022-10-30 NOTE — ED NOTES
I have reviewed discharge instructions with the patient. The patient verbalized understanding. Patient left ED via Discharge Method: crutches to Home with family. Opportunity for questions and clarification provided. Patient given 1 scripts. To continue your aftercare when you leave the hospital, you may receive an automated call from our care team to check in on how you are doing. This is a free service and part of our promise to provide the best care and service to meet your aftercare needs.  If you have questions, or wish to unsubscribe from this service please call [phone]. Thank you for Choosing our [facility] Emergency Department.         Cliff Briceno, AVERY  10/30/22 [id number]

## 2022-10-30 NOTE — ED PROVIDER NOTES
Emergency Department Provider Note                   PCP: Carly Astorga MD               Age: 61 y.o. Sex: female       ICD-10-CM    1. Right leg pain  M79.604 HYDROcodone-acetaminophen (NORCO) 5-325 MG per tablet      2. Rupture of right gastrocnemius tendon, initial encounter  S86.111A HYDROcodone-acetaminophen ([address]) 5-325 MG per tablet          DISPOSITION Decision To Discharge 10/30/2022 01:38:09 AM        MDM  Number of Diagnoses or Management Options  Right leg pain  Rupture of right gastrocnemius tendon, initial encounter  Diagnosis management comments: 59-year-old female presents to the emergency department for evaluation of right calf pain that began after playing pickle ball yesterday. Clinically patient with a partial gastrocnemius tear. Lab work showed normal white count, stable H&H, normal electrolytes and kidney function, normal LFTs and INR, D-dimer normal, Ultrasound did show an no evidence of DVT, likely Baker's cyst.  Given the significant tightness of her right calf, will obtain CK and lactic acid to rule out compartment syndrome. Patient given pain and nausea medication. Lactic acid and CK are normal.  Patient will be discharged home. Did speak with orthopedics who advised that patient can weight-bear as tolerated. Will give crutches. She will follow-up with orthopedics early this coming week. Patient will call to make this appointment. She will be given Norco to take for severe pain. Sedation precautions given. Patient and  voiced understanding and agreement with this plan.        Amount and/or Complexity of Data Reviewed  Clinical lab tests: ordered and reviewed  Tests in the radiology section of CPT®: ordered and reviewed  Discussion of test results with the performing providers: yes  Discuss the patient with other providers: yes    Risk of Complications, Morbidity, and/or Mortality  Presenting problems: moderate  Diagnostic procedures: moderate  Management options: moderate               Orders Placed This Encounter   Procedures    CBC with Auto Differential    Comprehensive Metabolic Panel    Protime-INR    D-Dimer, Quantitative    Lactic Acid    CK    CRUTCHES WITH INSTRUCTIONS    Vascular duplex lower extremity venous right        Medications   morphine injection 2 mg (2 mg IntraVENous Given 10/30/22 0051)   ondansetron (ZOFRAN) injection 4 mg (4 mg IntraVENous Given 10/30/22 0051)       New Prescriptions    HYDROCODONE-ACETAMINOPHEN (NORCO) 5-325 MG PER TABLET    Take 1 tablet by mouth every 6 hours as needed for Pain for up to 3 days. Intended supply: 3 days. Take lowest dose possible to manage pain        Avram Fabry is a 61 y.o. female who presents to the Emergency Department with chief complaint of    Chief Complaint   Patient presents with    Leg Pain     Patient presents with right leg pain and swelling since yesterday. Reports recent car travel from [place] and use of estradiol patch. 19-year-old female presents to the emerged part with right-sided calf pain. States pain began yesterday while playing pickle ball. States she felt that someone shot her in the back of her right calf. Since then has had significant pain with any ambulation. Reports unable to walk secondary to the pain. Denies knee pain or hip pain. Reports it is all within the calf. Normal sensation to the foot. Has been using a wheelchair. Is on estrogen. Did recently return from [place]. Denies fever or chills. Has been taking over-the-counter ibuprofen for symptoms. Review of Systems   Constitutional:  Negative for chills and fever. HENT:  Negative for sore throat. Eyes:  Negative for photophobia. Respiratory:  Negative for cough and shortness of breath. Cardiovascular:  Negative for chest pain. Gastrointestinal:  Negative for diarrhea, nausea and vomiting. Genitourinary:  Negative for dysuria.    Musculoskeletal:  Positive for 0.075 MG/24HR    Place 1 patch onto the skin Twice a Week    LEVOTHYROXINE (SYNTHROID) 112 MCG TABLET    TAKE 1 TABLET (112 MCG) BY MOUTH DAILY BRAND NAME ONLY    VITAMIN E 400 UNIT CAPSULE    Take 400 Units by mouth daily        Vitals signs and nursing note reviewed. No data found. Physical Exam  Vitals and nursing note reviewed. Constitutional:       General: She is not in acute distress. Appearance: Normal appearance. HENT:      Head: Normocephalic. Nose: Nose normal.      Mouth/Throat:      Mouth: Mucous membranes are moist.   Eyes:      Extraocular Movements: Extraocular movements intact. Cardiovascular:      Rate and Rhythm: Normal rate and regular rhythm. Pulses: Normal pulses. Heart sounds: Normal heart sounds. Pulmonary:      Effort: Pulmonary effort is normal. No respiratory distress. Breath sounds: Normal breath sounds. Abdominal:      General: Abdomen is flat. Musculoskeletal:         General: Swelling and tenderness present. Normal range of motion. Cervical back: Normal range of motion. No rigidity. Comments: Right lower extremity: No pain to the knee nor ankle. No joint effusion to the knee on exam.  Significant edema and tenderness to the right calf. Achilles is intact. Able to plantar and dorsiflex the right foot. Neurovascularly intact. No erythema to the right calf.  pain to the posterior aspect of the right lower extremity. Skin:     General: Skin is warm. Findings: No rash. Neurological:      General: No focal deficit present. Mental Status: She is alert and oriented to person, place, and time.    Psychiatric:         Mood and Affect: Mood normal.        Procedures    Results for orders placed or performed during the hospital encounter of 10/29/22   CBC with Auto Differential   Result Value Ref Range    WBC 9.6 4.3 - 11.1 K/uL    RBC 4.35 4.05 - 5.2 M/uL    Hemoglobin 13.2 11.7 - 15.4 g/dL    Hematocrit 37.5 35.8 - 46.3 % MCV 86.2 82.0 - 102.0 FL    MCH 30.3 26.1 - 32.9 PG    MCHC 35.2 (H) 31.4 - 35.0 g/dL    RDW 12.7 11.9 - 14.6 %    Platelets 584 262 - 645 K/uL    MPV 9.4 9.4 - 12.3 FL    nRBC 0.00 0.0 - 0.2 K/uL    Differential Type AUTOMATED      Seg Neutrophils 70 43 - 78 %    Lymphocytes 22 13 - 44 %    Monocytes 5 4.0 - 12.0 %    Eosinophils % 3 0.5 - 7.8 %    Basophils 1 0.0 - 2.0 %    Immature Granulocytes 0 0.0 - 5.0 %    Segs Absolute 6.7 1.7 - 8.2 K/UL    Absolute Lymph # 2.1 0.5 - 4.6 K/UL    Absolute Mono # 0.5 0.1 - 1.3 K/UL    Absolute Eos # 0.3 0.0 - 0.8 K/UL    Basophils Absolute 0.1 0.0 - 0.2 K/UL    Absolute Immature Granulocyte 0.0 0.0 - 0.5 K/UL   Comprehensive Metabolic Panel   Result Value Ref Range    Sodium 139 133 - 143 mmol/L    Potassium 3.5 3.5 - 5.1 mmol/L    Chloride 108 101 - 110 mmol/L    CO2 24 21 - 32 mmol/L    Anion Gap 7 2 - 11 mmol/L    Glucose 152 (H) 65 - 100 mg/dL    BUN 17 6 - 23 MG/DL    Creatinine 0.70 0.6 - 1.0 MG/DL    Est, Glom Filt Rate >60 >60 ml/min/1.73m2    Calcium 9.0 8.3 - 10.4 MG/DL    Total Bilirubin 0.7 0.2 - 1.1 MG/DL    ALT 23 12 - 65 U/L    AST 17 15 - 37 U/L    Alk Phosphatase 74 50 - 136 U/L    Total Protein 6.5 6.3 - 8.2 g/dL    Albumin 3.8 3.5 - 5.0 g/dL    Globulin 2.7 (L) 2.8 - 4.5 g/dL    Albumin/Globulin Ratio 1.4 0.4 - 1.6     Protime-INR   Result Value Ref Range    Protime 13.7 12.6 - 14.3 sec    INR 1.0     D-Dimer, Quantitative   Result Value Ref Range    D-Dimer, Quant 0.31 <0.56 ug/ml(FEU)   Lactic Acid   Result Value Ref Range    Lactic Acid, Plasma 0.6 0.4 - 2.0 MMOL/L   CK   Result Value Ref Range    Total  21 - 215 U/L   Vascular duplex lower extremity venous right    Narrative    EXAM: Right leg venous ultrasound. INDICATION: Right leg pain and swelling. COMPARISON: None. TECHNIQUE: Evaluation of the right leg veins was performed utilizing grey-scale,  color Doppler and duplex ultrasound.     FINDINGS: The right common femoral, superficial femoral, deep femoral,  popliteal, posterior tibialis, peroneal and greater saphenous veins are patent  and compressible, with normal response to augmentation. Incidentally noted is a  right knee joint effusion or Baker's cyst, measuring 3.3 x 0.9 x 4.3 cm. Impression    1. No evidence of right leg DVT. 2. Right knee joint effusion or Baker's cyst.        Vascular duplex lower extremity venous right   Final Result   1. No evidence of right leg DVT. 2. Right knee joint effusion or Baker's cyst.                          Voice dictation software was used during the making of this note. This software is not perfect and grammatical and other typographical errors may be present. This note has not been completely proofread for errors.         Flakito De Leon DO  10/30/22 0140

## 2022-10-31 ENCOUNTER — TELEPHONE (OUTPATIENT)
Dept: ORTHOPEDIC SURGERY | Age: 57
End: 2022-10-31

## 2022-10-31 NOTE — TELEPHONE ENCOUNTER
Patient in [place] this weekend with rt knee/leg/please look at and advise.  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

## 2022-11-01 ENCOUNTER — OFFICE VISIT (OUTPATIENT)
Dept: ORTHOPEDIC SURGERY | Age: 57
End: 2022-11-01
Payer: COMMERCIAL

## 2022-11-01 DIAGNOSIS — M79.661 PAIN OF RIGHT LOWER LEG: Primary | ICD-10-CM

## 2022-11-01 PROCEDURE — 99213 OFFICE O/P EST LOW 20 MIN: CPT | Performed by: NURSE PRACTITIONER

## 2022-11-01 NOTE — PROGRESS NOTES
Patient ID:  Angelica Grove  [id number]  44 y.o.  1965    Today: November 1, 2022          Chief Complaint:  right leg pain   HPI:       Angelica Grove is a 61 y.o. female seen for evaluation and treatment of right leg pain. She reports playing pickle ball last week when she felt a sudden, sharp pain in the right calf. Patient presented to the ER where they diagnosed her with a partial gastrocnemius tear. DVT and compartment syndrome were ruled out. They have attempted conservative treatment up to this point including NSAIDS, rest, ice and elevation. She is using a wheelchair since she did not feel comfortable using crutches. Past Medical History:  Past Medical History:   Diagnosis Date    Deviated septum     GERD (gastroesophageal reflux disease)     prn med    H. pylori infection 10/9/2013    Heart murmur     echo 10/31/13    Hypothyroid 10/24/2012    IBS (irritable bowel syndrome) 10/24/2012    denies    Migraine     with head lights    Peripheral vertigo of both ears     Rosacea 10/24/2012    Spontaneous pneumothorax 1999    had chest tube     Unspecified hearing loss, unspecified ear     Uterus fibroma     VSD (ventricular septal defect)     h/o; had heart cath in 1999 but was not found so no repair was needed. Past Surgical History:  Past Surgical History:   Procedure Laterality Date    CARDIAC CATHETERIZATION  1999    for VSD repair but was not found during cath     ARYA AND BSO (CERVIX REMOVED)  12/05/2018    THYROIDECTOMY  03/2011        Medications:     Prior to Admission medications    Medication Sig Start Date End Date Taking? Authorizing Provider   HYDROcodone-acetaminophen (NORCO) 5-325 MG per tablet Take 1 tablet by mouth every 6 hours as needed for Pain for up to 3 days. Intended supply: 3 days.  Take lowest dose possible to manage pain 10/30/22 11/2/22  Jeff French DO   levothyroxine (SYNTHROID) 112 MCG tablet TAKE 1 TABLET (112 MCG) BY MOUTH DAILY BRAND NAME ONLY 8/5/22 Holly Rain MD   estradiol (VIVELLE) 0.075 MG/24HR Place 1 patch onto the skin Twice a Week 22   Holly Rain MD   dexlansoprazole [facility]) 60 MG CPDR delayed release capsule Take 60 mg by mouth daily  Patient not taking: Reported on 2022   Ar Automatic Reconciliation   vitamin E 400 UNIT capsule Take 400 Units by mouth daily    Ar Automatic Reconciliation       Family History:     Family History   Problem Relation Age of Onset    Breast Cancer Paternal Aunt 54    Thyroid Disease Mother     No Known Problems Father     Thyroid Disease Sister     No Known Problems Maternal Grandmother     No Known Problems Maternal Grandfather     No Known Problems Paternal Grandmother     No Known Problems Paternal Grandfather        Social History:      Social History     Tobacco Use    Smoking status: Former     Types: Cigarettes     Quit date: 1999     Years since quittin.9    Smokeless tobacco: Never    Tobacco comments:     Quit smoking: social smoker for 5 years   Substance Use Topics    Alcohol use: Yes     Alcohol/week: 14.0 standard drinks         Allergies:    No Known Allergies     Vitals: There were no vitals taken for this visit. ROS:   Review of Systems         Objective:   General: Patient is awake and in no acute distress  Psych: Mood and affect appropriate  HEENT: Normocephalic. Atramatic. Pupils equal, round and reactive. Sclera normal.   Neck: Supple without obvious mass   Chest: Symmetric  Lungs:  Breathing non-labored. No tachypnea noted. Abdomen: Soft on gross examination without obvious distention. Neuro: No obvious neurologic deficit. Grossly moves bilateral upper extremities without motor or sensory deficits. No gross weakness noted in the lower extremities. No hyporeflexia or hyperreflexia noted. Vascular: No gross arterial or venous deficiency noted.  DP and PT pulses are palpable in the lower extremities  Lymphatic: No lymphedema noted in the lower extremities. Skin: No prior incisions noted about the rleft knee. No obvious rashes noted about the area. No skin changes noted about the knee or about the adjacent thigh or leg. Extremities:  Edema, tightness, ecchyhmosis and pain noted in the right calf. Distally the patient shows no neurologic deficit. Pain and limited ROM with plantar flexion. No erythema. Achilles is intact         Assessment:   1. Right leg pain     Plan:  Given the patient clinical presentation, including primary complaint and physical exam I recommend MRI of the right leg. Patient may continue RICE as tolerated. Recommend NSAID for pain. Will follow-up to discuss results and possible PT and/or referral to one of our sports medicine providers.         Signed By: NITISH Burrell - CNP  November 1, 2022

## 2022-11-03 ENCOUNTER — TELEPHONE (OUTPATIENT)
Dept: ORTHOPEDIC SURGERY | Age: 57
End: 2022-11-03

## 2022-11-03 NOTE — TELEPHONE ENCOUNTER
The patient has an MRI scheduled for tomorrow  and a peer to peer needs to be done. NICHELLE states that the case cannot be approved solely based on clinical info received. A peer to peer is available.      [phone]     Tracking# [id number]

## 2022-11-04 ENCOUNTER — HOSPITAL ENCOUNTER (OUTPATIENT)
Dept: MRI IMAGING | Age: 57
Discharge: HOME | End: 2022-11-07

## 2022-11-04 DIAGNOSIS — M79.661 PAIN OF RIGHT LOWER LEG: ICD-10-CM

## 2022-11-07 ENCOUNTER — TELEPHONE (OUTPATIENT)
Dept: ORTHOPEDIC SURGERY | Age: 57
End: 2022-11-07

## 2022-11-07 NOTE — TELEPHONE ENCOUNTER
She had a MRI on Friday and wants to know if Dr. Theresa Potter has reviewed this yet. Please let her know the next step. It has been 11 days, she is still hurting and she is frustrated.

## 2022-11-10 ENCOUNTER — OFFICE VISIT (OUTPATIENT)
Dept: ORTHOPEDIC SURGERY | Age: 57
End: 2022-11-10
Payer: COMMERCIAL

## 2022-11-10 DIAGNOSIS — M79.661 PAIN OF RIGHT LOWER LEG: Primary | ICD-10-CM

## 2022-11-10 PROCEDURE — 99213 OFFICE O/P EST LOW 20 MIN: CPT | Performed by: ORTHOPAEDIC SURGERY

## 2022-11-10 NOTE — PROGRESS NOTES
Patient ID:  Emilie Ibrahim  [id number]  89 y.o.  1965    Today: November 10, 2022          Chief Complaint:  Right Knee pain and locking    HPI:       Emilie Ibrahim is a 61 y.o. female seen for followup of right knee MRI. Review of the MRI shows a mass in the medial gastroc of unknown identity. Past Medical History:  Past Medical History:   Diagnosis Date    Deviated septum     GERD (gastroesophageal reflux disease)     prn med    H. pylori infection 10/9/2013    Heart murmur     echo 10/31/13    Hypothyroid 10/24/2012    IBS (irritable bowel syndrome) 10/24/2012    denies    Migraine     with head lights    Peripheral vertigo of both ears     Rosacea 10/24/2012    Spontaneous pneumothorax 1999    had chest tube     Unspecified hearing loss, unspecified ear     Uterus fibroma     VSD (ventricular septal defect)     h/o; had heart cath in 1999 but was not found so no repair was needed. Past Surgical History:  Past Surgical History:   Procedure Laterality Date    CARDIAC CATHETERIZATION  1999    for VSD repair but was not found during cath     ARYA AND BSO (CERVIX REMOVED)  12/05/2018    THYROIDECTOMY  03/2011        Medications:     Prior to Admission medications    Medication Sig Start Date End Date Taking?  Authorizing Provider   levothyroxine (SYNTHROID) 112 MCG tablet TAKE 1 TABLET (112 MCG) BY MOUTH DAILY BRAND NAME ONLY 8/5/22   Garry Hernandes MD   estradiol (VIVELLE) 0.075 MG/24HR Place 1 patch onto the skin Twice a Week 8/8/22   Garry Hernandes MD   dexlansoprazole (DEXILANT) 60 MG CPDR delayed release capsule Take 60 mg by mouth daily  Patient not taking: Reported on 8/23/2022 2/27/17   Ar Automatic Reconciliation   vitamin E 400 UNIT capsule Take 400 Units by mouth daily    Ar Automatic Reconciliation       Family History:     Family History   Problem Relation Age of Onset    Breast Cancer Paternal Aunt 54    Thyroid Disease Mother     No Known Problems Father     Thyroid Disease Sister     No Known Problems Maternal Grandmother     No Known Problems Maternal Grandfather     No Known Problems Paternal Grandmother     No Known Problems Paternal Grandfather        Social History:      Social History     Tobacco Use    Smoking status: Former     Types: Cigarettes     Quit date: 1999     Years since quittin.0    Smokeless tobacco: Never    Tobacco comments:     Quit smoking: social smoker for 5 years   Substance Use Topics    Alcohol use: Yes     Alcohol/week: 14.0 standard drinks         Allergies:    No Known Allergies     Vitals: There were no vitals taken for this visit. ROS:   Review of Systems         Objective:   General: Patient is awake and in no acute distress  Psych: Mood and affect appropriate  HEENT: Normocephalic. Atramatic. Pupils equal, round and reactive. Sclera normal.   Neck: Supple without obvious mass   Chest: Symmetric  Lungs:  Breathing non-labored. No tachypnea noted. Abdomen: Soft on gross examination without obvious distention. Neuro: No obvious neurologic deficit. Grossly moves bilateral upper extremities without motor or sensory deficits. No gross weakness noted in the lower extremities. No hyporeflexia or hyperreflexia noted. Vascular: No gross arterial or venous deficiency noted. DP and PT pulses are palpable in the lower extremities  Lymphatic: No lymphedema noted in the lower extremities. Skin: No prior incisions noted about the rleft knee. No obvious rashes noted about the area. No skin changes noted about the knee or about the adjacent thigh or leg. Extremities:  Patient ambulates with an antalgic gait. There is some pain with ROM of the right knee. Range of motion is unchanged from prior exam. Trace effusion noted in the knee. Patellofemoral crepitus is absent. There is pain with Nydia's testing. I cannot elicit on obvious lock with this maneuver but the maneuver does cause pain. Distally the patient shows no neurologic deficit. Xrays:     None        Assessment:     Right calf mass      Plan:   We discussed MRI findings and treatment options today along with risks, benefits and alternatives of each. At this point we need to rule out malignancy. We will repeat MRI to be done with and without contrast to eval for malignancy.           Signed By: Franck Cardenas MD  November 10, 2022

## 2022-11-14 ENCOUNTER — TELEPHONE (OUTPATIENT)
Dept: ORTHOPEDIC SURGERY | Age: 57
End: 2022-11-14

## 2022-11-17 ENCOUNTER — HOSPITAL ENCOUNTER (OUTPATIENT)
Dept: MRI IMAGING | Age: 57
Discharge: HOME | End: 2022-11-20

## 2022-11-17 DIAGNOSIS — M79.661 PAIN OF RIGHT LOWER LEG: ICD-10-CM

## 2022-11-22 ENCOUNTER — OFFICE VISIT (OUTPATIENT)
Dept: ORTHOPEDIC SURGERY | Age: 57
End: 2022-11-22
Payer: COMMERCIAL

## 2022-11-22 DIAGNOSIS — M79.661 RIGHT CALF PAIN: Primary | ICD-10-CM

## 2022-11-22 PROCEDURE — 99213 OFFICE O/P EST LOW 20 MIN: CPT | Performed by: ORTHOPAEDIC SURGERY

## 2022-11-22 NOTE — PROGRESS NOTES
Patient ID:  Rock De Jesus  [id number]  62 y.o.  1965    Today: November 22, 2022          Chief Complaint:  Right calf pain    HPI:       Rock De Jesus is a 61 y.o. female seen for followup of right calf MRI. Review of the MRI shows a large lesion involving the gastroc. Mri reads possible hematoma but isnt able to rule out mass. Past Medical History:  Past Medical History:   Diagnosis Date    Deviated septum     GERD (gastroesophageal reflux disease)     prn med    H. pylori infection 10/9/2013    Heart murmur     echo 10/31/13    Hypothyroid 10/24/2012    IBS (irritable bowel syndrome) 10/24/2012    denies    Migraine     with head lights    Peripheral vertigo of both ears     Rosacea 10/24/2012    Spontaneous pneumothorax 1999    had chest tube     Unspecified hearing loss, unspecified ear     Uterus fibroma     VSD (ventricular septal defect)     h/o; had heart cath in 1999 but was not found so no repair was needed. Past Surgical History:  Past Surgical History:   Procedure Laterality Date    CARDIAC CATHETERIZATION  1999    for VSD repair but was not found during cath     ARYA AND BSO (CERVIX REMOVED)  12/05/2018    THYROIDECTOMY  03/2011        Medications:     Prior to Admission medications    Medication Sig Start Date End Date Taking?  Authorizing Provider   levothyroxine (SYNTHROID) 112 MCG tablet TAKE 1 TABLET (112 MCG) BY MOUTH DAILY BRAND NAME ONLY 8/5/22   Arabella Bass MD   estradiol (VIVELLE) 0.075 MG/24HR Place 1 patch onto the skin Twice a Week 8/8/22   Arabella Bass MD   dexlansoprazole (DEXILANT) 60 MG CPDR delayed release capsule Take 60 mg by mouth daily  Patient not taking: Reported on 8/23/2022 2/27/17   Ar Automatic Reconciliation   vitamin E 400 UNIT capsule Take 400 Units by mouth daily    Ar Automatic Reconciliation       Family History:     Family History   Problem Relation Age of Onset    Breast Cancer Paternal Aunt 54    Thyroid Disease Mother     No Known Problems Father     Thyroid Disease Sister     No Known Problems Maternal Grandmother     No Known Problems Maternal Grandfather     No Known Problems Paternal Grandmother     No Known Problems Paternal Grandfather        Social History:      Social History     Tobacco Use    Smoking status: Former     Types: Cigarettes     Quit date: 1999     Years since quittin.0    Smokeless tobacco: Never    Tobacco comments:     Quit smoking: social smoker for 5 years   Substance Use Topics    Alcohol use: Yes     Alcohol/week: 14.0 standard drinks         Allergies:    No Known Allergies     Vitals: There were no vitals taken for this visit. ROS:   Review of Systems         Objective:   General: Patient is awake and in no acute distress  Psych: Mood and affect appropriate  HEENT: Normocephalic. Atramatic. Pupils equal, round and reactive. Sclera normal.   Neck: Supple without obvious mass   Chest: Symmetric  Lungs:  Breathing non-labored. No tachypnea noted. Abdomen: Soft on gross examination without obvious distention. Neuro: No obvious neurologic deficit. Grossly moves bilateral upper extremities without motor or sensory deficits. No gross weakness noted in the lower extremities. No hyporeflexia or hyperreflexia noted. Vascular: No gross arterial or venous deficiency noted. DP and PT pulses are palpable in the lower extremities  Lymphatic: No lymphedema noted in the lower extremities. Skin: No prior incisions noted about the rleft knee. No obvious rashes noted about the area. No skin changes noted about the knee or about the adjacent thigh or leg. Extremities:  Patient still has a lot of bruising of her calf along with notable swelling. Tender to touch over the gastroc complex. Xrays:     None        Assessment:     Right calf mass      Plan:   We discussed MRI findings.  I would like her to see Dr Jeff Bustillo over at [facility] as I am not sure of what the mass in her calf represents and how aggressive to be in working it up.  Will make referral and I will talk with Dr Kenna Haile later today        Signed By: Marvin Salas MD  November 22, 2022

## 2022-11-23 DIAGNOSIS — M79.661 RIGHT CALF PAIN: Primary | ICD-10-CM

## 2022-11-23 DIAGNOSIS — M79.661 PAIN OF RIGHT LOWER LEG: ICD-10-CM

## 2023-01-16 ENCOUNTER — TELEPHONE (OUTPATIENT)
Dept: FAMILY MEDICINE CLINIC | Facility: CLINIC | Age: 58
End: 2023-01-16

## 2023-01-16 RX ORDER — LEVOTHYROXINE SODIUM 112 MCG
TABLET ORAL
Qty: 30 TABLET | Refills: 2 | Status: SHIPPED | OUTPATIENT
Start: 2023-01-16

## 2023-01-16 RX ORDER — ESTRADIOL 0.07 MG/D
FILM, EXTENDED RELEASE TRANSDERMAL
Qty: 8 PATCH | Refills: 23 | Status: SHIPPED | OUTPATIENT
Start: 2023-01-16

## 2023-01-16 NOTE — TELEPHONE ENCOUNTER
Refill: Vivelle   Dosage: 0.075 mg/24hr  Freq: one patch on skin twice per week  To:  [facility] [address]    Refill: Synthroid  Dosage: 112 mcg tablet  Freq: one tablet by mouth daily name brand only  To:  [facility] [address]    Patient attempted to refill using MyChart but prescription was not available    **Patient is travelling out of the country for the month of February and needs to bring her medication with her.**    Patient has an appointment with Dr Heraclio Hill on 3/22.

## 2023-03-15 ENCOUNTER — NURSE ONLY (OUTPATIENT)
Dept: FAMILY MEDICINE CLINIC | Facility: CLINIC | Age: 58
End: 2023-03-15

## 2023-03-15 DIAGNOSIS — Z00.00 ENCOUNTER FOR ANNUAL PHYSICAL EXAM: ICD-10-CM

## 2023-03-15 DIAGNOSIS — E03.8 OTHER SPECIFIED HYPOTHYROIDISM: ICD-10-CM

## 2023-03-15 DIAGNOSIS — Z00.00 ENCOUNTER FOR ANNUAL PHYSICAL EXAM: Primary | ICD-10-CM

## 2023-03-15 LAB
ALBUMIN SERPL-MCNC: 4.1 G/DL (ref 3.5–5)
ALBUMIN/GLOB SERPL: 1.6 (ref 0.4–1.6)
ALP SERPL-CCNC: 75 U/L (ref 50–136)
ALT SERPL-CCNC: 19 U/L (ref 12–65)
ANION GAP SERPL CALC-SCNC: 3 MMOL/L (ref 2–11)
APPEARANCE UR: ABNORMAL
AST SERPL-CCNC: 17 U/L (ref 15–37)
BACTERIA URNS QL MICRO: ABNORMAL /HPF
BASOPHILS # BLD: 0.1 K/UL (ref 0–0.2)
BASOPHILS NFR BLD: 1 % (ref 0–2)
BILIRUB SERPL-MCNC: 0.6 MG/DL (ref 0.2–1.1)
BILIRUB UR QL: NEGATIVE
BUN SERPL-MCNC: 16 MG/DL (ref 6–23)
CALCIUM SERPL-MCNC: 9.2 MG/DL (ref 8.3–10.4)
CASTS URNS QL MICRO: 0 /LPF
CHLORIDE SERPL-SCNC: 111 MMOL/L (ref 101–110)
CHOLEST SERPL-MCNC: 192 MG/DL
CO2 SERPL-SCNC: 25 MMOL/L (ref 21–32)
COLOR UR: ABNORMAL
CREAT SERPL-MCNC: 0.7 MG/DL (ref 0.6–1)
CRYSTALS URNS QL MICRO: 0 /LPF
DIFFERENTIAL METHOD BLD: NORMAL
EOSINOPHIL # BLD: 0.3 K/UL (ref 0–0.8)
EOSINOPHIL NFR BLD: 6 % (ref 0.5–7.8)
EPI CELLS #/AREA URNS HPF: 0 /HPF
ERYTHROCYTE [DISTWIDTH] IN BLOOD BY AUTOMATED COUNT: 12.8 % (ref 11.9–14.6)
GLOBULIN SER CALC-MCNC: 2.5 G/DL (ref 2.8–4.5)
GLUCOSE SERPL-MCNC: 101 MG/DL (ref 65–100)
GLUCOSE UR STRIP.AUTO-MCNC: NEGATIVE MG/DL
HCT VFR BLD AUTO: 42.4 % (ref 35.8–46.3)
HDLC SERPL-MCNC: 62 MG/DL (ref 40–60)
HDLC SERPL: 3.1
HGB BLD-MCNC: 14.7 G/DL (ref 11.7–15.4)
HGB UR QL STRIP: NEGATIVE
IMM GRANULOCYTES # BLD AUTO: 0 K/UL (ref 0–0.5)
IMM GRANULOCYTES NFR BLD AUTO: 1 % (ref 0–5)
KETONES UR QL STRIP.AUTO: NEGATIVE MG/DL
LDLC SERPL CALC-MCNC: 110.8 MG/DL
LEUKOCYTE ESTERASE UR QL STRIP.AUTO: NEGATIVE
LYMPHOCYTES # BLD: 2.3 K/UL (ref 0.5–4.6)
LYMPHOCYTES NFR BLD: 37 % (ref 13–44)
MCH RBC QN AUTO: 30.2 PG (ref 26.1–32.9)
MCHC RBC AUTO-ENTMCNC: 34.7 G/DL (ref 31.4–35)
MCV RBC AUTO: 87.2 FL (ref 82–102)
MONOCYTES # BLD: 0.4 K/UL (ref 0.1–1.3)
MONOCYTES NFR BLD: 6 % (ref 4–12)
MUCOUS THREADS URNS QL MICRO: 0 /LPF
NEUTS SEG # BLD: 3 K/UL (ref 1.7–8.2)
NEUTS SEG NFR BLD: 49 % (ref 43–78)
NITRITE UR QL STRIP.AUTO: NEGATIVE
NRBC # BLD: 0 K/UL (ref 0–0.2)
OTHER OBSERVATIONS: ABNORMAL
PH UR STRIP: 5 (ref 5–9)
PLATELET # BLD AUTO: 291 K/UL (ref 150–450)
PMV BLD AUTO: 10.2 FL (ref 9.4–12.3)
POTASSIUM SERPL-SCNC: 4.5 MMOL/L (ref 3.5–5.1)
PROT SERPL-MCNC: 6.6 G/DL (ref 6.3–8.2)
PROT UR STRIP-MCNC: NEGATIVE MG/DL
RBC # BLD AUTO: 4.86 M/UL (ref 4.05–5.2)
RBC #/AREA URNS HPF: ABNORMAL /HPF
SODIUM SERPL-SCNC: 139 MMOL/L (ref 133–143)
SP GR UR REFRACTOMETRY: 1.02 (ref 1–1.02)
TRIGL SERPL-MCNC: 96 MG/DL (ref 35–150)
TSH, 3RD GENERATION: 0.75 UIU/ML (ref 0.36–3.74)
UROBILINOGEN UR QL STRIP.AUTO: 0.2 EU/DL (ref 0.2–1)
VLDLC SERPL CALC-MCNC: 19.2 MG/DL (ref 6–23)
WBC # BLD AUTO: 6 K/UL (ref 4.3–11.1)
WBC URNS QL MICRO: ABNORMAL /HPF

## 2023-03-22 ENCOUNTER — OFFICE VISIT (OUTPATIENT)
Dept: FAMILY MEDICINE CLINIC | Facility: CLINIC | Age: 58
End: 2023-03-22
Payer: COMMERCIAL

## 2023-03-22 VITALS
OXYGEN SATURATION: 97 % | SYSTOLIC BLOOD PRESSURE: 132 MMHG | WEIGHT: 149 LBS | HEIGHT: 64 IN | DIASTOLIC BLOOD PRESSURE: 74 MMHG | TEMPERATURE: 97.2 F | BODY MASS INDEX: 25.44 KG/M2 | HEART RATE: 73 BPM

## 2023-03-22 DIAGNOSIS — Z90.710 S/P ABDOMINAL HYSTERECTOMY: ICD-10-CM

## 2023-03-22 DIAGNOSIS — Z00.00 ENCOUNTER FOR GENERAL ADULT MEDICAL EXAMINATION WITHOUT ABNORMAL FINDINGS: ICD-10-CM

## 2023-03-22 DIAGNOSIS — R31.9 HEMATURIA, UNSPECIFIED TYPE: Primary | ICD-10-CM

## 2023-03-22 DIAGNOSIS — Z12.31 SCREENING MAMMOGRAM FOR BREAST CANCER: ICD-10-CM

## 2023-03-22 DIAGNOSIS — E03.4 ATROPHY OF THYROID (ACQUIRED): ICD-10-CM

## 2023-03-22 DIAGNOSIS — K21.00 GASTRO-ESOPHAGEAL REFLUX DISEASE WITH ESOPHAGITIS, WITHOUT BLEEDING: ICD-10-CM

## 2023-03-22 DIAGNOSIS — S80.12XD TRAUMATIC HEMATOMA OF LEFT LOWER LEG, SUBSEQUENT ENCOUNTER: ICD-10-CM

## 2023-03-22 PROCEDURE — 99396 PREV VISIT EST AGE 40-64: CPT | Performed by: FAMILY MEDICINE

## 2023-03-22 RX ORDER — ESTRADIOL 0.07 MG/D
FILM, EXTENDED RELEASE TRANSDERMAL
Qty: 8 PATCH | Refills: 23 | Status: SHIPPED | OUTPATIENT
Start: 2023-03-22

## 2023-03-22 RX ORDER — LEVOTHYROXINE SODIUM 112 UG/1
112 TABLET ORAL DAILY
Qty: 30 TABLET | Refills: 3 | Status: SHIPPED | OUTPATIENT
Start: 2023-03-22

## 2023-03-22 RX ORDER — LEVOTHYROXINE SODIUM 112 MCG
TABLET ORAL
Qty: 30 TABLET | Refills: 2 | Status: CANCELLED | OUTPATIENT
Start: 2023-03-22

## 2023-03-22 SDOH — ECONOMIC STABILITY: INCOME INSECURITY: HOW HARD IS IT FOR YOU TO PAY FOR THE VERY BASICS LIKE FOOD, HOUSING, MEDICAL CARE, AND HEATING?: NOT VERY HARD

## 2023-03-22 SDOH — ECONOMIC STABILITY: FOOD INSECURITY: WITHIN THE PAST 12 MONTHS, THE FOOD YOU BOUGHT JUST DIDN'T LAST AND YOU DIDN'T HAVE MONEY TO GET MORE.: NEVER TRUE

## 2023-03-22 SDOH — ECONOMIC STABILITY: FOOD INSECURITY: WITHIN THE PAST 12 MONTHS, YOU WORRIED THAT YOUR FOOD WOULD RUN OUT BEFORE YOU GOT MONEY TO BUY MORE.: NEVER TRUE

## 2023-03-22 SDOH — ECONOMIC STABILITY: HOUSING INSECURITY
IN THE LAST 12 MONTHS, WAS THERE A TIME WHEN YOU DID NOT HAVE A STEADY PLACE TO SLEEP OR SLEPT IN A SHELTER (INCLUDING NOW)?: NO

## 2023-03-22 ASSESSMENT — PATIENT HEALTH QUESTIONNAIRE - PHQ9
2. FEELING DOWN, DEPRESSED OR HOPELESS: 0
SUM OF ALL RESPONSES TO PHQ QUESTIONS 1-9: 0
SUM OF ALL RESPONSES TO PHQ QUESTIONS 1-9: 0
1. LITTLE INTEREST OR PLEASURE IN DOING THINGS: 0
SUM OF ALL RESPONSES TO PHQ QUESTIONS 1-9: 0
SUM OF ALL RESPONSES TO PHQ QUESTIONS 1-9: 0
SUM OF ALL RESPONSES TO PHQ9 QUESTIONS 1 & 2: 0

## 2023-03-25 LAB
BACTERIA SPEC CULT: NORMAL
SERVICE CMNT-IMP: NORMAL

## 2023-03-30 ENCOUNTER — TELEPHONE (OUTPATIENT)
Dept: FAMILY MEDICINE CLINIC | Facility: CLINIC | Age: 58
End: 2023-03-30

## 2023-03-30 NOTE — TELEPHONE ENCOUNTER
Patient is coughing and states her mucous has turned yellow. She's asking for antibiotic to be sent to [facility] [address] in [place]. Please call patient to let her know.

## 2023-03-30 NOTE — TELEPHONE ENCOUNTER
CALLED PT BACK REGARDING PRIOR CALL AS PER DR HERNANDEZ PT NEEDS TO BE SEEN FIRST BEFORE GIVING RX NO APPT SLOTS TOLD PT TO GO [facility] PT IS AWARE

## 2023-04-03 ASSESSMENT — ENCOUNTER SYMPTOMS
RESPIRATORY NEGATIVE: 1
HOARSE VOICE: 1
EYES NEGATIVE: 1
HEARTBURN: 1

## 2023-04-03 NOTE — PROGRESS NOTES
Final    Alk Phosphatase 03/15/2023 75  50 - 136 U/L Final    Total Protein 03/15/2023 6.6  6.3 - 8.2 g/dL Final    Albumin 03/15/2023 4.1  3.5 - 5.0 g/dL Final    Globulin 03/15/2023 2.5 (L)  2.8 - 4.5 g/dL Final    Albumin/Globulin Ratio 03/15/2023 1.6  0.4 - 1.6   Final    Cholesterol, Total 03/15/2023 192  <200 MG/DL Final    Comment: Borderline High: 200-239 mg/dL  High: Greater than or equal to 240 mg/dL      Triglycerides 03/15/2023 96  35 - 150 MG/DL Final    Comment: Borderline High: 150-199 mg/dL, High: 200-499 mg/dL  Very High: Greater than or equal to 500 mg/dL      HDL 03/15/2023 62 (H)  40 - 60 MG/DL Final    LDL Calculated 03/15/2023 110.8 (H)  <100 MG/DL Final    Comment: Near Optimal: 100-129 mg/dL  Borderline High: 130-159, High: 160-189 mg/dL  Very High: Greater than or equal to 190 mg/dL      VLDL Cholesterol Calculated 03/15/2023 19.2  6.0 - 23.0 MG/DL Final    Chol/HDL Ratio 03/15/2023 3.1    Final    WBC 03/15/2023 6.0  4.3 - 11.1 K/uL Final    RBC 03/15/2023 4.86  4.05 - 5.2 M/uL Final    Hemoglobin 03/15/2023 14.7  11.7 - 15.4 g/dL Final    Hematocrit 03/15/2023 42.4  35.8 - 46.3 % Final    MCV 03/15/2023 87.2  82 - 102 FL Final    MCH 03/15/2023 30.2  26.1 - 32.9 PG Final    MCHC 03/15/2023 34.7  31.4 - 35.0 g/dL Final    RDW 03/15/2023 12.8  11.9 - 14.6 % Final    Platelets 36/19/3493 291  150 - 450 K/uL Final    MPV 03/15/2023 10.2  9.4 - 12.3 FL Final    nRBC 03/15/2023 0.00  0.0 - 0.2 K/uL Final    **Note: Absolute NRBC parameter is now reported with Hemogram**    Differential Type 03/15/2023 AUTOMATED    Final    Seg Neutrophils 03/15/2023 49  43 - 78 % Final    Lymphocytes 03/15/2023 37  13 - 44 % Final    Monocytes 03/15/2023 6  4.0 - 12.0 % Final    Eosinophils % 03/15/2023 6  0.5 - 7.8 % Final    Basophils 03/15/2023 1  0.0 - 2.0 % Final    Immature Granulocytes 03/15/2023 1  0.0 - 5.0 % Final    Segs Absolute 03/15/2023 3.0  1.7 - 8.2 K/UL Final    Absolute Lymph # 03/15/2023 2.3

## 2023-04-26 RX ORDER — LEVOTHYROXINE SODIUM 112 MCG
TABLET ORAL
Qty: 30 TABLET | Refills: 2 | OUTPATIENT
Start: 2023-04-26

## 2023-04-26 RX ORDER — ESTRADIOL 0.07 MG/D
FILM, EXTENDED RELEASE TRANSDERMAL
Qty: 8 PATCH | Refills: 23 | OUTPATIENT
Start: 2023-04-26

## 2023-05-24 RX ORDER — ESTRADIOL 0.07 MG/D
FILM, EXTENDED RELEASE TRANSDERMAL
Qty: 8 PATCH | Refills: 23 | OUTPATIENT
Start: 2023-05-24

## 2023-05-24 RX ORDER — LEVOTHYROXINE SODIUM 112 MCG
TABLET ORAL
Qty: 30 TABLET | Refills: 2 | OUTPATIENT
Start: 2023-05-24

## 2023-06-05 RX ORDER — LEVOTHYROXINE SODIUM 112 MCG
TABLET ORAL
Qty: 30 TABLET | Refills: 2 | OUTPATIENT
Start: 2023-06-05

## 2023-07-07 ENCOUNTER — HOSPITAL ENCOUNTER (OUTPATIENT)
Dept: MAMMOGRAPHY | Age: 58
Discharge: HOME | End: 2023-07-10
Attending: FAMILY MEDICINE

## 2023-07-07 DIAGNOSIS — Z12.31 SCREENING MAMMOGRAM FOR BREAST CANCER: ICD-10-CM

## 2023-07-13 ENCOUNTER — HOSPITAL ENCOUNTER (OUTPATIENT)
Dept: MAMMOGRAPHY | Age: 58
Discharge: HOME | End: 2023-07-13
Attending: FAMILY MEDICINE
Payer: COMMERCIAL

## 2023-07-13 ENCOUNTER — HOSPITAL ENCOUNTER (OUTPATIENT)
Dept: MAMMOGRAPHY | Age: 58
End: 2023-07-13
Attending: FAMILY MEDICINE
Payer: COMMERCIAL

## 2023-07-13 DIAGNOSIS — R92.8 ABNORMAL SCREENING MAMMOGRAM: ICD-10-CM

## 2023-07-13 PROCEDURE — 76642 ULTRASOUND BREAST LIMITED: CPT

## 2023-07-13 PROCEDURE — G0279 TOMOSYNTHESIS, MAMMO: HCPCS

## 2023-07-17 RX ORDER — ESTRADIOL 0.07 MG/D
FILM, EXTENDED RELEASE TRANSDERMAL
Qty: 8 PATCH | Refills: 23 | OUTPATIENT
Start: 2023-07-17

## 2023-09-06 RX ORDER — LEVOTHYROXINE SODIUM 112 MCG
TABLET ORAL
Qty: 30 TABLET | Refills: 3 | OUTPATIENT
Start: 2023-09-06

## 2023-09-11 RX ORDER — LEVOTHYROXINE SODIUM 112 UG/1
112 TABLET ORAL DAILY
Qty: 90 TABLET | Refills: 0 | Status: SHIPPED | OUTPATIENT
Start: 2023-09-11

## 2023-09-11 NOTE — TELEPHONE ENCOUNTER
Refill: synthroid  Dosage: 112 mcg  Freq: Take 1 tablet by mouth Daily  [facility] [address] [facility] Eloisa Barrett  Ph: [phone]    Pt is currently in [place] until November

## 2023-10-22 NOTE — BRIEF OP NOTE
BRIEF OPERATIVE NOTE Date of Procedure: 12/5/2018 Preoperative Diagnosis: Fibroids, intramural [D25.1] Pelvic pain in female [R10.2] Postoperative Diagnosis: Fibroids, intramural [D25.1] Pelvic pain in female [R10.2] Procedure(s): HYSTERECTOMY ABDOMINAL TOTAL WITH BILATERAL SALPINGO-OOPHORECTOMY Surgeon(s) and Role: * [facility] Assisting Surgical Staff: 
Circ-1: Celeste Gayle RN 
Circ-2: Jeffrey Crews RN Scrub Tech-1: [facility] Scrub Tech-2: [facility] Event Time In Time Out Incision Start 8704 Incision Close 5268 Anesthesia: General  
Estimated Blood Loss: 75 Specimens:  
ID Type Source Tests Collected by Time Destination 1 : uterus, bilateral tubes and ovaries Fresh   Екатерина Caceres MD 12/5/2018 0830 Pathology Findings: enlarged uterus with fibroid, normal tubes and ovaries. Complications: no 
Implants: * No implants in log * 4 = No assist / stand by assistance

## 2023-11-29 RX ORDER — LEVOTHYROXINE SODIUM 112 UG/1
TABLET ORAL
Qty: 30 TABLET | Refills: 2 | OUTPATIENT
Start: 2023-11-29

## 2023-12-06 ENCOUNTER — TELEPHONE (OUTPATIENT)
Dept: FAMILY MEDICINE CLINIC | Facility: CLINIC | Age: 58
End: 2023-12-06

## 2023-12-06 NOTE — TELEPHONE ENCOUNTER
Patient is positive for Covid. She also thinks she has a sinus infection. [facility] [address]?

## 2023-12-07 RX ORDER — NIRMATRELVIR AND RITONAVIR 150-100 MG
KIT ORAL
Qty: 20 TABLET | Refills: 0 | Status: SHIPPED | OUTPATIENT
Start: 2023-12-07 | End: 2023-12-11

## 2023-12-07 RX ORDER — LEVOTHYROXINE SODIUM 112 MCG
TABLET ORAL
Qty: 90 TABLET | Refills: 3 | Status: SHIPPED | OUTPATIENT
Start: 2023-12-07

## 2024-01-02 RX ORDER — LEVOTHYROXINE SODIUM 112 UG/1
TABLET ORAL
Qty: 30 TABLET | OUTPATIENT
Start: 2024-01-02

## 2024-01-02 RX ORDER — ESTRADIOL 0.07 MG/D
FILM, EXTENDED RELEASE TRANSDERMAL
Qty: 8 PATCH | Refills: 20 | OUTPATIENT
Start: 2024-01-02

## 2024-01-12 ENCOUNTER — TELEPHONE (OUTPATIENT)
Dept: ORTHOPEDIC SURGERY | Age: 59
End: 2024-01-12

## 2024-01-12 DIAGNOSIS — M75.41 SUBACROMIAL IMPINGEMENT OF RIGHT SHOULDER: ICD-10-CM

## 2024-01-12 DIAGNOSIS — M19.011 ARTHRITIS OF RIGHT ACROMIOCLAVICULAR JOINT: ICD-10-CM

## 2024-01-12 DIAGNOSIS — M25.511 RIGHT SHOULDER PAIN, UNSPECIFIED CHRONICITY: Primary | ICD-10-CM

## 2024-01-12 NOTE — TELEPHONE ENCOUNTER
Called and spoke to pt , pt will be out for the country for 2 weeks so she will call back once she is back to have mri and dose pack order.  MRI order was placed but will be schedule once pt gets back from her trip.

## 2024-02-01 DIAGNOSIS — M75.41 SUBACROMIAL IMPINGEMENT OF RIGHT SHOULDER: ICD-10-CM

## 2024-02-01 DIAGNOSIS — M19.011 ARTHRITIS OF RIGHT ACROMIOCLAVICULAR JOINT: ICD-10-CM

## 2024-02-01 DIAGNOSIS — M25.511 RIGHT SHOULDER PAIN, UNSPECIFIED CHRONICITY: Primary | ICD-10-CM

## 2024-02-01 NOTE — TELEPHONE ENCOUNTER
Pt called and asked to order a mri for her shoulder. She also asked for a prescription for pain. Please call pt.

## 2024-02-02 RX ORDER — PREDNISONE 5 MG/1
TABLET ORAL
Qty: 1 EACH | Refills: 2 | Status: SHIPPED | OUTPATIENT
Start: 2024-02-02

## 2024-02-09 ENCOUNTER — TELEPHONE (OUTPATIENT)
Dept: ORTHOPEDIC SURGERY | Age: 59
End: 2024-02-09

## 2024-02-09 NOTE — TELEPHONE ENCOUNTER
She has a question regarding the medication and pain. She states she needs to move forward with the MRI.

## 2024-02-12 NOTE — TELEPHONE ENCOUNTER
Called and spoke to pt , pt mri and mri results apt were schedule . Pt was given time and date of both apt.

## 2024-02-14 ENCOUNTER — TELEPHONE (OUTPATIENT)
Dept: ORTHOPEDIC SURGERY | Age: 59
End: 2024-02-14

## 2024-02-19 ENCOUNTER — TELEPHONE (OUTPATIENT)
Dept: ORTHOPEDIC SURGERY | Age: 59
End: 2024-02-19

## 2024-02-19 NOTE — TELEPHONE ENCOUNTER
Please correct mri order for mri  still have it undwer  poa.she is she vida for /not here   [id number] at 8.00

## 2024-02-20 ENCOUNTER — HOSPITAL ENCOUNTER (OUTPATIENT)
Dept: MRI IMAGING | Age: 59
Discharge: HOME | End: 2024-02-23
Attending: ORTHOPAEDIC SURGERY

## 2024-02-20 DIAGNOSIS — M19.011 ARTHRITIS OF RIGHT ACROMIOCLAVICULAR JOINT: ICD-10-CM

## 2024-02-20 DIAGNOSIS — M75.41 SUBACROMIAL IMPINGEMENT OF RIGHT SHOULDER: ICD-10-CM

## 2024-02-20 DIAGNOSIS — M25.511 RIGHT SHOULDER PAIN, UNSPECIFIED CHRONICITY: ICD-10-CM

## 2024-02-23 ENCOUNTER — TELEPHONE (OUTPATIENT)
Dept: ORTHOPEDIC SURGERY | Age: 59
End: 2024-02-23

## 2024-03-04 ENCOUNTER — OFFICE VISIT (OUTPATIENT)
Dept: ORTHOPEDIC SURGERY | Age: 59
End: 2024-03-04
Payer: COMMERCIAL

## 2024-03-04 VITALS — BODY MASS INDEX: 24.75 KG/M2 | HEIGHT: 64 IN | WEIGHT: 145 LBS

## 2024-03-04 DIAGNOSIS — S46.111A STRAIN OF MUSCLE, FASCIA AND TENDON OF LONG HEAD OF BICEPS, RIGHT ARM, INITIAL ENCOUNTER: ICD-10-CM

## 2024-03-04 DIAGNOSIS — S46.011A STRAIN OF TENDON OF RIGHT ROTATOR CUFF, INITIAL ENCOUNTER: Primary | ICD-10-CM

## 2024-03-04 DIAGNOSIS — M19.011 DEGENERATIVE JOINT DISEASE OF RIGHT ACROMIOCLAVICULAR JOINT: ICD-10-CM

## 2024-03-04 PROCEDURE — 99214 OFFICE O/P EST MOD 30 MIN: CPT | Performed by: ORTHOPAEDIC SURGERY

## 2024-03-04 NOTE — PROGRESS NOTES
Name: Lupe Timmons  YOB: 1965  Gender: female  MRN: [id number]      What: Right shoulder pain  How: Pickleball  When: November 2023    Referring provider: Dr. Lucero    HPI: Lupe Timmons is a 58 y.o. right-hand-dominant female seen at the request of Dr. Lucero for right shoulder problems.  She has a history of hypothyroidism status post goiter resection euthyroid on medication.  No previous shoulder problems.  She started playing pickle ball.  She noted the onset of right shoulder pain in November 2023.  No previous shoulder problems.  No numbness or tingling.  She was seen by Dr. Lucero.  She was given a right shoulder injection and oral steroids without improvement.  She has not had supervised physical therapy yet.  The right shoulder affects her quality of life.  It hurts.  She has limited function.      ROS/Meds/PSH/PMH/FH/SH: A ten system review of systems was performed and is negative other than what is in the HPI.   Tobacco:  reports that she quit smoking about 24 years ago. Her smoking use included cigarettes. She has never used smokeless tobacco.  Ht 1.626 m (5' 4\")   Wt 65.8 kg (145 lb)   BMI 24.89 kg/m²      Physical Examination:  She is an awake alert pleasant female ambulating without difficulty  She has a full range of cervical spine motion without radicular findings    The left shoulder has 0 to 180 degrees of active and 0 to 180 degrees passive forward elevation.   Internal rotation is to T6.  External rotation is to 60 degrees at the side.   In the 90 degree abducted position 90 degrees of external and 90 degrees internal rotation  The AC joint is non-tender  SC joint is non-tender.   Greater tuberosity is non-tender.  negative biceps  Negative O'Briens sign  negative lift-off sign  Negative belly press sign  Negative bear huggers sign  negative drop sign  negative hornblower's sign  No posterior glenohumeral joint line tenderness.   No evident excessive

## 2024-03-05 ENCOUNTER — TELEPHONE (OUTPATIENT)
Dept: ORTHOPEDIC SURGERY | Age: 59
End: 2024-03-05

## 2024-03-06 ENCOUNTER — TELEPHONE (OUTPATIENT)
Dept: ORTHOPEDIC SURGERY | Age: 59
End: 2024-03-06

## 2024-03-06 DIAGNOSIS — S46.011A STRAIN OF TENDON OF RIGHT ROTATOR CUFF, INITIAL ENCOUNTER: Primary | ICD-10-CM

## 2024-03-06 NOTE — TELEPHONE ENCOUNTER
Called and spoke to pt who will go to therapy at [facility] whom, she was told by her insurance, was covered.

## 2024-03-06 NOTE — TELEPHONE ENCOUNTER
Pt would like you send a her PT order to [facility] in [facility] at [phone]

## 2024-03-11 ENCOUNTER — HOSPITAL ENCOUNTER (OUTPATIENT)
Dept: PHYSICAL THERAPY | Age: 59
Setting detail: RECURRING SERIES
Discharge: HOME | End: 2024-03-14

## 2024-03-11 DIAGNOSIS — M75.41 IMPINGEMENT SYNDROME OF RIGHT SHOULDER: ICD-10-CM

## 2024-03-11 DIAGNOSIS — M25.511 CHRONIC RIGHT SHOULDER PAIN: Primary | ICD-10-CM

## 2024-03-11 DIAGNOSIS — G89.29 CHRONIC RIGHT SHOULDER PAIN: Primary | ICD-10-CM

## 2024-03-11 DIAGNOSIS — M62.81 MUSCLE WEAKNESS (GENERALIZED): ICD-10-CM

## 2024-03-11 NOTE — PROGRESS NOTES
PT Screen:    Lupe Timmons has been playing pickleball and back in November she started having shoulder pain.  She is concerned over finances for therapy as she has $2500 deductible that has not been met.  After this insurance covers 75%.  Will reach out to [facility] nurse now also.  +Impingement R UE.   Hx of injection R UE ~ December.  No improvement.  - empty can.  MRI partial thickness tear.      Instructed in ER TB and Rows resisted and will touch base with her to see how she is doing - encouraged her to call me if she has any concerns/questions and I will also relay to nurse/MD.

## 2024-03-15 DIAGNOSIS — E03.4 ATROPHY OF THYROID (ACQUIRED): ICD-10-CM

## 2024-03-15 DIAGNOSIS — Z00.00 ANNUAL PHYSICAL EXAM: Primary | ICD-10-CM

## 2024-03-18 ENCOUNTER — TELEPHONE (OUTPATIENT)
Dept: FAMILY MEDICINE CLINIC | Facility: CLINIC | Age: 59
End: 2024-03-18

## 2024-03-18 ENCOUNTER — NURSE ONLY (OUTPATIENT)
Dept: FAMILY MEDICINE CLINIC | Facility: CLINIC | Age: 59
End: 2024-03-18

## 2024-03-18 DIAGNOSIS — E03.4 ATROPHY OF THYROID (ACQUIRED): ICD-10-CM

## 2024-03-18 DIAGNOSIS — Z00.00 ANNUAL PHYSICAL EXAM: ICD-10-CM

## 2024-03-18 LAB
ALBUMIN SERPL-MCNC: 4 G/DL (ref 3.5–5)
ALBUMIN/GLOB SERPL: 1.7 (ref 0.4–1.6)
ALP SERPL-CCNC: 68 U/L (ref 50–136)
ALT SERPL-CCNC: 21 U/L (ref 12–65)
ANION GAP SERPL CALC-SCNC: 3 MMOL/L (ref 2–11)
APPEARANCE UR: CLEAR
AST SERPL-CCNC: 17 U/L (ref 15–37)
BACTERIA URNS QL MICRO: ABNORMAL /HPF
BASOPHILS # BLD: 0 K/UL (ref 0–0.2)
BASOPHILS NFR BLD: 1 % (ref 0–2)
BILIRUB SERPL-MCNC: 0.7 MG/DL (ref 0.2–1.1)
BILIRUB UR QL: NEGATIVE
BUN SERPL-MCNC: 15 MG/DL (ref 6–23)
CALCIUM SERPL-MCNC: 9.1 MG/DL (ref 8.3–10.4)
CASTS URNS QL MICRO: ABNORMAL /LPF (ref 0–2)
CHLORIDE SERPL-SCNC: 109 MMOL/L (ref 103–113)
CHOLEST SERPL-MCNC: 210 MG/DL
CO2 SERPL-SCNC: 29 MMOL/L (ref 21–32)
COLOR UR: ABNORMAL
CREAT SERPL-MCNC: 0.7 MG/DL (ref 0.6–1)
DIFFERENTIAL METHOD BLD: NORMAL
EOSINOPHIL # BLD: 0.2 K/UL (ref 0–0.8)
EOSINOPHIL NFR BLD: 4 % (ref 0.5–7.8)
EPI CELLS #/AREA URNS HPF: ABNORMAL /HPF (ref 0–5)
ERYTHROCYTE [DISTWIDTH] IN BLOOD BY AUTOMATED COUNT: 12.6 % (ref 11.9–14.6)
GLOBULIN SER CALC-MCNC: 2.4 G/DL (ref 2.8–4.5)
GLUCOSE SERPL-MCNC: 99 MG/DL (ref 65–100)
GLUCOSE UR STRIP.AUTO-MCNC: NEGATIVE MG/DL
HCT VFR BLD AUTO: 41.6 % (ref 35.8–46.3)
HDLC SERPL-MCNC: 61 MG/DL (ref 40–60)
HDLC SERPL: 3.4
HGB BLD-MCNC: 14.4 G/DL (ref 11.7–15.4)
HGB UR QL STRIP: NEGATIVE
IMM GRANULOCYTES # BLD AUTO: 0 K/UL (ref 0–0.5)
IMM GRANULOCYTES NFR BLD AUTO: 0 % (ref 0–5)
KETONES UR QL STRIP.AUTO: NEGATIVE MG/DL
LDLC SERPL CALC-MCNC: 136.4 MG/DL
LEUKOCYTE ESTERASE UR QL STRIP.AUTO: NEGATIVE
LYMPHOCYTES # BLD: 1.8 K/UL (ref 0.5–4.6)
LYMPHOCYTES NFR BLD: 34 % (ref 13–44)
MCH RBC QN AUTO: 30.8 PG (ref 26.1–32.9)
MCHC RBC AUTO-ENTMCNC: 34.6 G/DL (ref 31.4–35)
MCV RBC AUTO: 88.9 FL (ref 82–102)
MONOCYTES # BLD: 0.3 K/UL (ref 0.1–1.3)
MONOCYTES NFR BLD: 6 % (ref 4–12)
MUCOUS THREADS URNS QL MICRO: 0 /LPF
NEUTS SEG # BLD: 2.9 K/UL (ref 1.7–8.2)
NEUTS SEG NFR BLD: 55 % (ref 43–78)
NITRITE UR QL STRIP.AUTO: NEGATIVE
NRBC # BLD: 0 K/UL (ref 0–0.2)
PH UR STRIP: 5 (ref 5–9)
PLATELET # BLD AUTO: 268 K/UL (ref 150–450)
PMV BLD AUTO: 10.2 FL (ref 9.4–12.3)
POTASSIUM SERPL-SCNC: 4.3 MMOL/L (ref 3.5–5.1)
PROT SERPL-MCNC: 6.4 G/DL (ref 6.3–8.2)
PROT UR STRIP-MCNC: NEGATIVE MG/DL
RBC # BLD AUTO: 4.68 M/UL (ref 4.05–5.2)
RBC #/AREA URNS HPF: ABNORMAL /HPF (ref 0–5)
SODIUM SERPL-SCNC: 141 MMOL/L (ref 136–146)
SP GR UR REFRACTOMETRY: 1.02 (ref 1–1.02)
TRIGL SERPL-MCNC: 63 MG/DL (ref 35–150)
TSH, 3RD GENERATION: 0.46 UIU/ML (ref 0.36–3.74)
URINE CULTURE IF INDICATED: ABNORMAL
UROBILINOGEN UR QL STRIP.AUTO: 0.2 EU/DL (ref 0.2–1)
VLDLC SERPL CALC-MCNC: 12.6 MG/DL (ref 6–23)
WBC # BLD AUTO: 5.3 K/UL (ref 4.3–11.1)
WBC URNS QL MICRO: ABNORMAL /HPF (ref 0–4)

## 2024-03-18 NOTE — TELEPHONE ENCOUNTER
Pt called and said she got her Estradiol and it said to use twice weekly, but she said she wants to use it once weekly.     Any questions call [phone]

## 2024-03-25 ENCOUNTER — OFFICE VISIT (OUTPATIENT)
Dept: FAMILY MEDICINE CLINIC | Facility: CLINIC | Age: 59
End: 2024-03-25
Payer: COMMERCIAL

## 2024-03-25 ENCOUNTER — PATIENT MESSAGE (OUTPATIENT)
Dept: FAMILY MEDICINE CLINIC | Facility: CLINIC | Age: 59
End: 2024-03-25

## 2024-03-25 VITALS
SYSTOLIC BLOOD PRESSURE: 120 MMHG | WEIGHT: 145 LBS | HEART RATE: 75 BPM | DIASTOLIC BLOOD PRESSURE: 72 MMHG | TEMPERATURE: 97 F | BODY MASS INDEX: 24.75 KG/M2 | OXYGEN SATURATION: 99 % | HEIGHT: 64 IN

## 2024-03-25 DIAGNOSIS — Z00.00 ANNUAL PHYSICAL EXAM: Primary | ICD-10-CM

## 2024-03-25 DIAGNOSIS — Z90.710 S/P ABDOMINAL HYSTERECTOMY: ICD-10-CM

## 2024-03-25 DIAGNOSIS — E03.4 ATROPHY OF THYROID (ACQUIRED): ICD-10-CM

## 2024-03-25 PROCEDURE — 99396 PREV VISIT EST AGE 40-64: CPT | Performed by: FAMILY MEDICINE

## 2024-03-25 RX ORDER — CYCLOBENZAPRINE HCL 10 MG
5 TABLET ORAL NIGHTLY PRN
Qty: 30 TABLET | Refills: 0 | Status: SHIPPED | OUTPATIENT
Start: 2024-03-25 | End: 2024-05-24

## 2024-03-25 RX ORDER — ESTRADIOL 0.07 MG/D
1 FILM, EXTENDED RELEASE TRANSDERMAL
Qty: 8 PATCH | Refills: 3 | Status: SHIPPED | OUTPATIENT
Start: 2024-03-25

## 2024-03-25 RX ORDER — ESTRADIOL 0.07 MG/D
FILM, EXTENDED RELEASE TRANSDERMAL
Qty: 8 PATCH | Refills: 17 | OUTPATIENT
Start: 2024-03-25

## 2024-03-25 ASSESSMENT — PATIENT HEALTH QUESTIONNAIRE - PHQ9
SUM OF ALL RESPONSES TO PHQ QUESTIONS 1-9: 0
SUM OF ALL RESPONSES TO PHQ QUESTIONS 1-9: 0
SUM OF ALL RESPONSES TO PHQ9 QUESTIONS 1 & 2: 0
2. FEELING DOWN, DEPRESSED OR HOPELESS: NOT AT ALL
SUM OF ALL RESPONSES TO PHQ QUESTIONS 1-9: 0
1. LITTLE INTEREST OR PLEASURE IN DOING THINGS: NOT AT ALL
SUM OF ALL RESPONSES TO PHQ QUESTIONS 1-9: 0
2. FEELING DOWN, DEPRESSED OR HOPELESS: NOT AT ALL
1. LITTLE INTEREST OR PLEASURE IN DOING THINGS: NOT AT ALL
SUM OF ALL RESPONSES TO PHQ9 QUESTIONS 1 & 2: 0

## 2024-03-26 ENCOUNTER — TELEPHONE (OUTPATIENT)
Dept: FAMILY MEDICINE CLINIC | Facility: CLINIC | Age: 59
End: 2024-03-26

## 2024-03-26 RX ORDER — ESTRADIOL 0.07 MG/D
1 FILM, EXTENDED RELEASE TRANSDERMAL WEEKLY
Qty: 8 PATCH | Refills: 3 | Status: SHIPPED | OUTPATIENT
Start: 2024-03-26

## 2024-03-26 NOTE — TELEPHONE ENCOUNTER
From: Lupe Timmons  To: Dr. Edwin Aguilar  Sent: 3/25/2024 7:51 PM EDT  Subject: Estradiol 0.075    [facility] They said the product has to be the same as the directions:  Estradiol 0.075 (once weekly)  Apply 1 patch once weekly  Thank you!

## 2024-03-26 NOTE — TELEPHONE ENCOUNTER
Who's Calling: Nat  [facility]     Message: pt states her insurance covers estradiol once weekly. Med sent by  is twice weekly. [email or web address]    Phone #: [phone]  Fax #:

## 2024-04-02 ASSESSMENT — ENCOUNTER SYMPTOMS
EYES NEGATIVE: 1
GASTROINTESTINAL NEGATIVE: 1

## 2024-04-02 NOTE — PROGRESS NOTES
Final    BACTERIA, URINE 03/18/2024 TOO NUMEROUS TO COUNT (A)  Negative /hpf Final    Epithelial Cells UA 03/18/2024 10-20 (A)  0 - 5 /hpf Final    Casts 03/18/2024 0-2  0 - 2 /lpf Final    HYALINE    Mucus, UA 03/18/2024 0  0 /lpf Final       ASSESSMENT and PLAN    Annual physical exam  Atrophy of thyroid (acquired)  S/P abdominal hysterectomy  The patient was seen today for the annual preventive health visit.  The patient was provided anticipatory guidance and counseling regarding age / sex appropriate health maintenance issues including vaccinations, blood pressure screening, lipid screening, cancer screenings, diet, exercise, avoidance of tobacco / substance abuse.     Current treatment plan is effective. no changes in therapy  lab results and schedule for future lab studies reviewed with patient  reviewed diet, exercise and weight control   Cardiovascular risk and specific lipid/ LDL goals reviewed    No follow-ups on file.     GONSALO HERNANDEZ MD

## 2024-05-17 ENCOUNTER — HOSPITAL ENCOUNTER (OUTPATIENT)
Dept: PHYSICAL THERAPY | Age: 59
Setting detail: RECURRING SERIES
Discharge: HOME | End: 2024-05-20
Payer: COMMERCIAL

## 2024-05-17 DIAGNOSIS — G89.29 CHRONIC RIGHT SHOULDER PAIN: ICD-10-CM

## 2024-05-17 DIAGNOSIS — M25.511 RIGHT SHOULDER PAIN, UNSPECIFIED CHRONICITY: Primary | ICD-10-CM

## 2024-05-17 DIAGNOSIS — M25.511 CHRONIC RIGHT SHOULDER PAIN: ICD-10-CM

## 2024-05-17 PROCEDURE — 97110 THERAPEUTIC EXERCISES: CPT

## 2024-05-17 PROCEDURE — 97161 PT EVAL LOW COMPLEX 20 MIN: CPT

## 2024-05-17 PROCEDURE — 97140 MANUAL THERAPY 1/> REGIONS: CPT

## 2024-05-17 NOTE — PROGRESS NOTES
PT F/U Screen Note: Lupe Timmons appeared to clinic today with spouse.  She has full ROM and great strength.  Recommended she perform shoulder taps to work on stabilization.

## 2024-05-28 RX ORDER — CYCLOBENZAPRINE HCL 10 MG
5 TABLET ORAL NIGHTLY PRN
Qty: 30 TABLET | Refills: 0 | OUTPATIENT
Start: 2024-05-28 | End: 2024-07-27

## 2024-06-11 RX ORDER — ESTRADIOL 0.07 MG/D
FILM, EXTENDED RELEASE TRANSDERMAL
Qty: 8 PATCH | Refills: 17 | OUTPATIENT
Start: 2024-06-11

## 2024-07-01 RX ORDER — ESTRADIOL 0.07 MG/D
1 PATCH TRANSDERMAL WEEKLY
Qty: 4 PATCH | Refills: 3 | OUTPATIENT
Start: 2024-07-01

## 2024-07-14 RX ORDER — CYCLOBENZAPRINE HCL 10 MG
5 TABLET ORAL NIGHTLY PRN
Qty: 30 TABLET | Refills: 0 | Status: SHIPPED | OUTPATIENT
Start: 2024-07-14 | End: 2024-09-12

## 2024-07-14 RX ORDER — ESTRADIOL 0.07 MG/D
1 PATCH TRANSDERMAL WEEKLY
Qty: 4 PATCH | Refills: 3 | Status: SHIPPED | OUTPATIENT
Start: 2024-07-14

## 2024-09-03 RX ORDER — LEVOTHYROXINE SODIUM 112 MCG
TABLET ORAL
Qty: 90 TABLET | Refills: 3 | OUTPATIENT
Start: 2024-09-03

## 2024-09-10 RX ORDER — CYCLOBENZAPRINE HCL 10 MG
5 TABLET ORAL NIGHTLY PRN
Qty: 30 TABLET | Refills: 0 | OUTPATIENT
Start: 2024-09-10 | End: 2024-11-09

## 2024-10-15 RX ORDER — CYCLOBENZAPRINE HCL 10 MG
5 TABLET ORAL NIGHTLY PRN
Qty: 30 TABLET | Refills: 0 | OUTPATIENT
Start: 2024-10-15 | End: 2024-12-14

## 2024-10-22 RX ORDER — ESTRADIOL 0.07 MG/D
PATCH TRANSDERMAL
Qty: 4 PATCH | Refills: 3 | OUTPATIENT
Start: 2024-10-22

## 2024-10-24 RX ORDER — LEVOTHYROXINE SODIUM 112 MCG
TABLET ORAL
Qty: 60 TABLET | Refills: 1 | OUTPATIENT
Start: 2024-10-24

## 2024-11-04 RX ORDER — ESTRADIOL 0.07 MG/D
PATCH TRANSDERMAL
Qty: 4 PATCH | Refills: 3 | OUTPATIENT
Start: 2024-11-04

## 2024-11-06 ENCOUNTER — OFFICE VISIT (OUTPATIENT)
Age: 59
End: 2024-11-06

## 2024-11-06 DIAGNOSIS — S46.111A STRAIN OF MUSCLE, FASCIA AND TENDON OF LONG HEAD OF BICEPS, RIGHT ARM, INITIAL ENCOUNTER: ICD-10-CM

## 2024-11-06 DIAGNOSIS — M19.011 DEGENERATIVE JOINT DISEASE OF RIGHT ACROMIOCLAVICULAR JOINT: ICD-10-CM

## 2024-11-06 DIAGNOSIS — S46.011A STRAIN OF TENDON OF RIGHT ROTATOR CUFF, INITIAL ENCOUNTER: Primary | ICD-10-CM

## 2024-11-06 RX ORDER — ESTRADIOL 0.07 MG/D
PATCH TRANSDERMAL
Qty: 4 PATCH | Refills: 3 | OUTPATIENT
Start: 2024-11-06

## 2024-11-06 NOTE — PROGRESS NOTES
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  I discussed nonoperative versus operative intervention including injections.  Specifically today I discussed the potential need for an arthroscopy right shoulder ASD without acromioplasty,  ADCR, extensive debridement SLAP tear, biceps labral complex, glenohumeral joint capsule, subacromial space, mini open rotator cuff repair and biceps tenodesis.  This would be performed utilizing general anesthesia with an interscalene block on an outpatient basis.  I would measure a hemoglobin A1c and a fasting blood glucose.  I discussed the risks and benefits of the procedure with her and expected outcomes.  We will defer surgery  for now.  I injected her right shoulder.  We will place her in supervised physical therapy.  If she does not improve we will proceed as outlined above.  I will recheck her back in 3 months  4.  Chronic illness with exacerbation    Follow up: Return in about 3 months (around 2/6/2025).     S46.011  S46.111  M19.011    DIONNE SULLIVAN JR, MD

## 2024-11-07 ENCOUNTER — HOSPITAL ENCOUNTER (OUTPATIENT)
Dept: PHYSICAL THERAPY | Age: 59
Setting detail: RECURRING SERIES
Discharge: HOME | End: 2024-11-10
Payer: COMMERCIAL

## 2024-11-07 DIAGNOSIS — M25.511 RIGHT SHOULDER PAIN, UNSPECIFIED CHRONICITY: Primary | ICD-10-CM

## 2024-11-07 DIAGNOSIS — M75.51 BURSITIS OF RIGHT SHOULDER: ICD-10-CM

## 2024-11-07 PROCEDURE — 97110 THERAPEUTIC EXERCISES: CPT

## 2024-11-07 PROCEDURE — 97161 PT EVAL LOW COMPLEX 20 MIN: CPT

## 2024-11-07 PROCEDURE — 97140 MANUAL THERAPY 1/> REGIONS: CPT

## 2024-11-07 NOTE — PROGRESS NOTES
Luperadha Govea Sweesy  : 1965  Primary: Nathalie Sc (Samuel OSUNA)  Secondary:  [facility]  [address] [place] [address]  Phone: [phone]  Fax: [phone] Plan Frequency: 2-3x week    Plan of Care/Certification Expiration Date: 25        Plan of Care/Certification Expiration Date:  Plan of Care/Certification Expiration Date: 25    Frequency/Duration:   Plan Frequency: 2-3x week      Time In/Out:   Time In: 1440  Time Out: 1530      PT Visit Info:         Visit Count:  1    OUTPATIENT PHYSICAL THERAPY:   Treatment Note 2024       Episode  (R shoulder pain)               Treatment Diagnosis:    Right shoulder pain, unspecified chronicity  Bursitis of right shoulder  Medical/Referring Diagnosis:    Strain of tendon of right rotator cuff, initial encounter [S46.011A]  Strain of muscle, fascia and tendon of long head of biceps, right arm, initial encounter [S46.111A]  Degenerative joint disease of right acromioclavicular joint [M19.011]      Referring Physician:  Fab Larson Jr., MD MD Orders:  PT Eval and Treat   Return MD Appt:    Future Appointments   Date Time Provider Department Center   2/10/2025 12:15 PM Fab Larson Jr., MD [facility]        Date of Onset:  No data recorded   Allergies:   Patient has no known allergies.  Restrictions/Precautions:   None      Interventions Planned (Treatment may consist of any combination of the following):     See Assessment Note    Subjective Comments:   I want to play pickleball again.  Initial Pain Level::      /10  Post Session Pain Level:        /10  Medications Last Reviewed:  2024  Updated Objective Findings:  See Evaluation Note from today  Treatment   THERAPEUTIC EXERCISE: (14  minutes):    Exercises per grid below to improve mobility, strength, and balance.  Required minimal visual, verbal, and manual cues to promote proper body alignment and promote proper body posture.  Progressed

## 2024-11-07 NOTE — THERAPY EVALUATION
Mobility:  limited ER movement    Outcome Measure:   Tool Used: AARON Shoulder Score  Initial Score: 68/100 Most Recent: X/100 (Date: XX/XX)   Interpretation of Score: 20 questions each scored on a 3 point scale with 0 representing \"extreme difficulty or unable to perform\" and 3 representing \"no difficulty\", 3 questions each scored from 0-10 about pain, and 1 question scored 0-10 about function of the shoulder  The lower the score, the greater the functional disability. 100/100 represents no disability, pain or loss of function.  Minimal clinically important difference is 11.4. Minimal detectable change is 12.1.      ASSESSMENT   Initial Assessment:  End range pain with shoulder horizontal abduction - s/s of impingement that improves with manual technique - recommending scap strengthening and posterior capsule.  Therapy Problem List: (Impacting functional  limitations):    Decreased Strength, Decreased ROM, Decreased Functional Mobility, Decreased [place] with Home Exercise Program, Decreased Posture, Decreased Body Mechanics, and Decreased Activity Tolerance/Endurance*   Therapy Prognosis:   Good     Initial Assessment Complexity:   Low Complexity       PLAN   Effective Dates: 11/7/2024 TO Plan of Care/Certification Expiration Date: 02/05/25     Frequency/Duration: Plan Frequency: 2-3x week      Interventions Planned (Treatment may consist of any combination of the following):    Home Exercise Program (HEP), Therapeutic Activites, and Therapeutic Exercise/Strengthening   GOALS: (Goals have been discussed and agreed upon with patient.)  Short-Term Functional Goals: Time Frame: 8 weeks  Pt will be compliant with HEP in order to increase UE strength/endurance/mobility to improve functional mobility and overall quality of life.  Pt will improve score on the AARON by 4 points in order to improve independence with ADLs and IADLs and to improve overall quality of life.  Pt will increase shoulder strength to 4+/5 with

## 2024-11-08 RX ORDER — ESTRADIOL 0.07 MG/D
1 PATCH TRANSDERMAL WEEKLY
Qty: 4 PATCH | Refills: 3 | Status: SHIPPED | OUTPATIENT
Start: 2024-11-08

## 2025-02-10 ENCOUNTER — TRANSCRIBE ORDERS (OUTPATIENT)
Dept: SCHEDULING | Age: 60
End: 2025-02-10

## 2025-02-10 DIAGNOSIS — Z12.31 VISIT FOR SCREENING MAMMOGRAM: Primary | ICD-10-CM

## 2025-02-13 ENCOUNTER — HOSPITAL ENCOUNTER (OUTPATIENT)
Dept: MAMMOGRAPHY | Age: 60
Discharge: HOME | End: 2025-02-16
Attending: FAMILY MEDICINE
Payer: COMMERCIAL

## 2025-02-13 DIAGNOSIS — Z12.31 VISIT FOR SCREENING MAMMOGRAM: ICD-10-CM

## 2025-02-13 PROCEDURE — 77063 BREAST TOMOSYNTHESIS BI: CPT

## 2025-02-14 RX ORDER — ESTRADIOL 0.07 MG/D
1 PATCH TRANSDERMAL WEEKLY
Qty: 4 PATCH | Refills: 3 | OUTPATIENT
Start: 2025-02-14

## 2025-07-11 ENCOUNTER — HOSPITAL ENCOUNTER (EMERGENCY)
Age: 60
Discharge: HOME | End: 2025-07-11
Payer: COMMERCIAL

## 2025-07-11 VITALS
RESPIRATION RATE: 18 BRPM | HEART RATE: 84 BPM | SYSTOLIC BLOOD PRESSURE: 120 MMHG | BODY MASS INDEX: 22.5 KG/M2 | WEIGHT: 140 LBS | OXYGEN SATURATION: 97 % | HEIGHT: 66 IN | DIASTOLIC BLOOD PRESSURE: 68 MMHG | TEMPERATURE: 98.2 F

## 2025-07-11 DIAGNOSIS — S86.812A STRAIN OF CALF MUSCLE, LEFT, INITIAL ENCOUNTER: Primary | ICD-10-CM

## 2025-07-11 PROCEDURE — 6370000000 HC RX 637 (ALT 250 FOR IP): Performed by: STUDENT IN AN ORGANIZED HEALTH CARE EDUCATION/TRAINING PROGRAM

## 2025-07-11 PROCEDURE — 99283 EMERGENCY DEPT VISIT LOW MDM: CPT

## 2025-07-11 RX ORDER — HYDROCODONE BITARTRATE AND ACETAMINOPHEN 7.5; 325 MG/1; MG/1
1 TABLET ORAL
Refills: 0 | Status: COMPLETED | OUTPATIENT
Start: 2025-07-11 | End: 2025-07-11

## 2025-07-11 RX ORDER — ONDANSETRON 4 MG/1
4 TABLET, ORALLY DISINTEGRATING ORAL
Status: COMPLETED | OUTPATIENT
Start: 2025-07-11 | End: 2025-07-11

## 2025-07-11 RX ORDER — HYDROCODONE BITARTRATE AND ACETAMINOPHEN 5; 325 MG/1; MG/1
1 TABLET ORAL EVERY 8 HOURS PRN
Qty: 9 TABLET | Refills: 0 | Status: SHIPPED | OUTPATIENT
Start: 2025-07-11 | End: 2025-07-14

## 2025-07-11 RX ORDER — MELOXICAM 15 MG/1
15 TABLET ORAL DAILY
Qty: 30 TABLET | Refills: 0 | Status: SHIPPED | OUTPATIENT
Start: 2025-07-11 | End: 2025-08-10

## 2025-07-11 RX ADMIN — ONDANSETRON 4 MG: 4 TABLET, ORALLY DISINTEGRATING ORAL at 12:15

## 2025-07-11 RX ADMIN — HYDROCODONE BITARTRATE AND ACETAMINOPHEN 1 TABLET: 7.5; 325 TABLET ORAL at 12:14

## 2025-07-11 ASSESSMENT — LIFESTYLE VARIABLES
HOW MANY STANDARD DRINKS CONTAINING ALCOHOL DO YOU HAVE ON A TYPICAL DAY: PATIENT DOES NOT DRINK
HOW OFTEN DO YOU HAVE A DRINK CONTAINING ALCOHOL: NEVER

## 2025-07-11 ASSESSMENT — PAIN SCALES - GENERAL
PAINLEVEL_OUTOF10: 6
PAINLEVEL_OUTOF10: 6

## 2025-07-11 ASSESSMENT — ENCOUNTER SYMPTOMS
VOMITING: 0
COUGH: 0
PHOTOPHOBIA: 0
ABDOMINAL PAIN: 0
SHORTNESS OF BREATH: 0
FACIAL SWELLING: 0
TROUBLE SWALLOWING: 0

## 2025-07-11 ASSESSMENT — PAIN DESCRIPTION - ORIENTATION: ORIENTATION: LEFT

## 2025-07-11 ASSESSMENT — PAIN DESCRIPTION - LOCATION: LOCATION: LEG

## 2025-07-11 NOTE — ED PROVIDER NOTES
Emergency Department Provider Note       PCP: Mariluz Duff MD   Age: 60 y.o.   Sex: female     DISPOSITION Decision To Discharge 07/11/2025 12:30:55 PM                ICD-10-CM    1. Strain of calf muscle, left, initial encounter  S86.812A [facility]     HYDROcodone-acetaminophen (NORCO) 5-325 MG per tablet          Medical Decision Making     In summary this is a 60-year-old female patient who presented with left calf pain that I feel is consistent with calf strain.  Did not have evidence of Achilles tendon rupture.  Low suspicion for avulsion fracture given pain is only in the soft tissue of the calf.  No evidence of compartment syndrome.  No evidence of neurovascular disruption.  We will place her in a boot and referred to orthopedics.  Counseled on signs to return for.  Patient discharged in stable condition.     1 acute complicated illness or injury.  Prescription drug management performed.  Patient was discharged risks and benefits of hospitalization were considered.  Shared medical decision making was utilized in creating the patients health plan today.  ED attending physician present in department at time of care. Based on current hospital policy, their co-signature is not required on this note.  I independently ordered and reviewed each unique test.     The patients assessment required an independent historian: Friend.  The reason they were needed is important historical information not provided by the patient.                History     60-year-old female patient presents today complaint of left mid calf pain that began while playing pickle ball today.  Reports she is not sure how it happened but it started hurting and now she feels like she cannot bear weight secondary to the pain.  Reports she had a strain on the right side and this feels exactly similar.  Reports does not have any pain at the ankle or the knee.  Reports it hurts more to move.  She denies any

## 2025-07-11 NOTE — ED TRIAGE NOTES
Pt presents to ED with c/o left leg pain. Pt states she was playing pickle ball when she suddenly started having severe pain in her left calf.

## 2025-07-11 NOTE — DISCHARGE INSTRUCTIONS
Wear the boot until you see orthopedic doctor.  Use Mobic daily for pain.  Use Norco as needed for breakthrough pain.  Follow-up with orthopedics.  They will call to set your appointment up.  Return here for new or worsening symptoms    Risk of opioid analgesics include, but are not limited to: Overdosing can stop or slow your breathing and lead to death; fractures from falls; drowsiness leading to injury; tolerance, dependence and addiction. You should not operate any motorized vehicles or work from a height greater than ground level when taking opioid analgesics as they increase your fall risks. Do not combine these medications with any other opioid, to include street opioids such as heroin. Do not combine these medications with benzodiazepines like Xanax or alcohol as this may cause severe respiratory depression and death.    As we discussed, I did not find a life threatening cause of your symptoms today. However, THAT DOES NOT MEAN IT COULD NOT DEVELOP. If you develop ANY new or worsening symptoms, it is critical that you return for re-evaluation. This includes any symptoms that are concerning to you, especially symptoms such as fevers, severe pain, numbness, weakness.  If you do not return for re-evaluation, you risk serious complications, including death.    It was my pleasure to take care of you today in the emergency department. Thank you for coming in today. I hope that we were able to help you out and make you more comfortable. I hope you have a speedy recovery! If you do get a survey in the mail asking how your care was, it really helps me and our department out if you respond. Thank you!

## (undated) DEVICE — APPLICATOR BNDG 1MM ADH PREMIERPRO EXOFIN

## (undated) DEVICE — SPONGE LAP 18X18IN STRL -- 5/PK

## (undated) DEVICE — DRAPE,T,LAPARO,TRANS,STERILE: Brand: MEDLINE

## (undated) DEVICE — SUTURE VCRL SZ 4-0 L27IN ABSRB UD L60MM KS STR REV CUT NDL J662H

## (undated) DEVICE — SURGICAL PROCEDURE PACK BASIC ST FRANCIS

## (undated) DEVICE — TRAY CATH 16F DRN BG LTX -- CONVERT TO ITEM 363158

## (undated) DEVICE — DRAPE TWL SURG 16X26IN BLU ORB04] ALLCARE INC]

## (undated) DEVICE — 2000CC GUARDIAN II: Brand: GUARDIAN

## (undated) DEVICE — CURVED, LARGE JAW, OPEN SEALER/DIVIDER NANO-COATED: Brand: LIGASURE IMPACT

## (undated) DEVICE — REM POLYHESIVE ADULT PATIENT RETURN ELECTRODE: Brand: VALLEYLAB

## (undated) DEVICE — PERI-PAD,MODERATE: Brand: CURITY

## (undated) DEVICE — TRAY PREP DRY W/ PREM GLV 2 APPL 6 SPNG 2 UNDPD 1 OVERWRAP

## (undated) DEVICE — SOLUTION IV 1000ML 0.9% SOD CHL

## (undated) DEVICE — SUTURE ABSORBABLE BRAIDED 0 CT-1 8X27 IN UD VICRYL JJ41G

## (undated) DEVICE — BLADE ELECTRODE: Brand: VALLEYLAB

## (undated) DEVICE — KENDALL SCD EXPRESS SLEEVES, KNEE LENGTH, MEDIUM: Brand: KENDALL SCD

## (undated) DEVICE — CONTAINER SPEC HISTOLOGY 900ML POLYPR

## (undated) DEVICE — SUT CHRMC 2-0 27IN CT1 BRN --

## (undated) DEVICE — SUTURE PDS II SZ 0 L36IN ABSRB VLT L36MM CT-1 1/2 CIR Z346H

## (undated) DEVICE — SUTURE PLN GUT SZ 2-0 L27IN ABSRB YELLOWISH TAN L40MM CT 853H

## (undated) DEVICE — SUTURE VCRL SZ 1 L27IN ABSRB UD CT-1 L36MM 1/2 CIR J261H

## (undated) DEVICE — CARDINAL HEALTH FLEXIBLE LIGHT HANDLE COVER: Brand: CARDINAL HEALTH